# Patient Record
Sex: FEMALE | Race: WHITE | NOT HISPANIC OR LATINO | Employment: FULL TIME | URBAN - METROPOLITAN AREA
[De-identification: names, ages, dates, MRNs, and addresses within clinical notes are randomized per-mention and may not be internally consistent; named-entity substitution may affect disease eponyms.]

---

## 2017-08-15 ENCOUNTER — OFFICE VISIT (OUTPATIENT)
Dept: PEDIATRICS | Facility: CLINIC | Age: 37
End: 2017-08-15
Payer: COMMERCIAL

## 2017-08-15 VITALS
BODY MASS INDEX: 29.8 KG/M2 | WEIGHT: 185.4 LBS | SYSTOLIC BLOOD PRESSURE: 106 MMHG | HEIGHT: 66 IN | HEART RATE: 70 BPM | TEMPERATURE: 98 F | DIASTOLIC BLOOD PRESSURE: 69 MMHG

## 2017-08-15 DIAGNOSIS — Z30.430 ENCOUNTER FOR IUD INSERTION: Primary | ICD-10-CM

## 2017-08-15 DIAGNOSIS — Z30.432 ENCOUNTER FOR IUD REMOVAL: ICD-10-CM

## 2017-08-15 PROCEDURE — 58300 INSERT INTRAUTERINE DEVICE: CPT | Performed by: NURSE PRACTITIONER

## 2017-08-15 PROCEDURE — 58301 REMOVE INTRAUTERINE DEVICE: CPT | Performed by: NURSE PRACTITIONER

## 2017-08-15 NOTE — NURSING NOTE
"Chief Complaint   Patient presents with     Contraception     IUD vs Nexplanon.       Initial /69  Pulse 70  Temp 98  F (36.7  C) (Tympanic)  Ht 5' 6\" (1.676 m)  Wt 185 lb 6.4 oz (84.1 kg)  BMI 29.92 kg/m2 Estimated body mass index is 29.92 kg/(m^2) as calculated from the following:    Height as of this encounter: 5' 6\" (1.676 m).    Weight as of this encounter: 185 lb 6.4 oz (84.1 kg).  Medication Reconciliation: complete    "

## 2017-08-15 NOTE — MR AVS SNAPSHOT
After Visit Summary   8/15/2017    Lesli Grey    MRN: [id number]           Patient Information     Date Of Birth          1980        Visit Information        Provider Department      8/15/2017 10:00 AM Olga Lidia Galindo APRN CNP [facility]        Care Instructions    You may experience a period or spotting that will last over the next week. This usually starts to lighten after a few days. By the time you come back to clinic in 1 month for follow up, this spotting is usually gone, unless you are one of the few that experience spotting that can last up to 3-6 months. You may experience cramping for the remainder of the day and continue to take ibuprofen 2-3 tabs every six hours as needed. This will likely subside over the next day.      Schedule a follow up appointment with me in 1 month. Please contact me by [employer] of phone if you should have any unusual symptoms or concerns in the mean time.             Follow-ups after your visit        Your next 10 appointments already scheduled     Aug 25, 2017  3:30 PM CDT   PHYSICAL with GUERLINE Pennington CNP   [facility] ([facility])    [address] [place] [address]  [address]  [facility] [id number] [phone]              Who to contact     If you have questions or need follow up information about today's clinic visit or your schedule please contact [facility] directly at [phone].  Normal or non-critical lab and imaging results will be communicated to you by MyChart, letter or phone within 4 business days after the clinic has received the results. If you do not hear from us within 7 days, please contact the clinic through MyChart or phone. If you have a critical or abnormal lab result, we will notify you by phone as soon as possible.  Submit refill requests through [employer] or call your pharmacy and they will forward the refill request to us. Please allow 3 business days for  "your refill to be completed.          Additional Information About Your Visit        MyChart Information     [employer] gives you secure access to your electronic health record. If you see a primary care provider, you can also send messages to your care team and make appointments. If you have questions, please call your primary care clinic.  If you do not have a primary care provider, please call [phone] and they will assist you.        Care EveryWhere ID     This is your Care EveryWhere ID. This could be used by other organizations to access your [place] medical records  [id number]        Your Vitals Were     Pulse Temperature Height BMI (Body Mass Index)          70 98  F (36.7  C) (Tympanic) 5' 6\" (1.676 m) 29.92 kg/m2         Blood Pressure from Last 3 Encounters:   08/15/17 106/69   04/21/16 106/69   03/22/16 101/62    Weight from Last 3 Encounters:   08/15/17 185 lb 6.4 oz (84.1 kg)   04/21/16 174 lb (78.9 kg)   03/22/16 175 lb 3.2 oz (79.5 kg)              Today, you had the following     No orders found for display       Primary Care Provider Office Phone # Fax #    Jamila TIERRA Perez [phone] [address] [facility] [address]        Equal Access to Services     [facility]: Hadii aad ku hadasho Soomaali, waaxda luqadaha, qaybta kaalmada adeegyada, waxay idiin hayaan eva pleitez . So [facility] [phone].    ATENCIÓN: Si habla español, tiene a bonner disposición servicios gratuitos de asistencia lingüística. Llame al [phone].    We comply with applicable federal civil rights laws and [place] laws. We do not discriminate on the basis of race, color, national origin, age, disability sex, sexual orientation or gender identity.            Thank you!     Thank you for choosing [facility]  for your care. Our goal is always to provide you with excellent care. Hearing back from our patients is one way we can continue to improve our services. Please take a " few minutes to complete the written survey that you may receive in the mail after your visit with us. Thank you!             Your Updated Medication List - Protect others around you: Learn how to safely use, store and throw away your medicines at www.disposemymeds.org.          This list is accurate as of: 8/15/17 10:51 AM.  Always use your most recent med list.                   Brand Name Dispense Instructions for use Diagnosis    MULTIVITAMIN PO      Take  by mouth.

## 2017-08-15 NOTE — PROGRESS NOTES
"Chief Complaint/ History of Present Illness:Lesli Grey is a 36 year old female.   No LMP recorded. Patient is not currently having periods (Reason: IUD)..  A pregnancy test was not done today.  She is currently using IUD for birth control.   A chlamydia/gonorrhea test was was not done today because of not needed  She is here for an IUD insertion.  Patient has been given written information.  I have reviewed the risks of the IUD including pregnancy, PID, life threatening infection, perforation, expulsion, cramping, changes in bleeding and ovarian cysts. Benefits of the IUD and alternative family planning methods have been discussed.  Patients questions are answered.  Patient has verbalized understanding of risks and benefits and has signed the consent form.    No Known Allergies  Current Outpatient Prescriptions   Medication Sig Dispense Refill     Multiple Vitamin (MULTIVITAMIN OR) Take  by mouth.        Past Medical History:   Diagnosis Date     NO ACTIVE PROBLEMS      Past Surgical History:   Procedure Laterality Date     HC INSERTION INTRAUTERINE DEVICE  9-11-12    Mirena     TONSILLECTOMY  1988       REVIEW OF SYSTEMS  CONSTITUTIONAL: Denies fever, chills and night sweats  BREASTS:  Denies discharge and lumps.    HEART/LUNGS: Denies dyspnea, wheezing, coughing and chest pain.  HEMATOLOGIC: Denies tendency to bruise and history of blood clots.  GENITOURINARY:  Denies urgency, dysuria and hematuria.  NEUROLOGIC:  Denies seizures, weakness and fainting.  PSYCHIATRIC: Negative for changes in mood or affect      EXAM:  /69  Pulse 70  Temp 98  F (36.7  C) (Tympanic)  Ht 5' 6\" (1.676 m)  Wt 185 lb 6.4 oz (84.1 kg)  BMI 29.92 kg/m2    Abdomen: soft, nontender, without hepatosplenomegaly or masses  : normal cervix, adnexae, and uterus without masses or discharge. IUD strings visualized and removed with ring forceps  IUD type: Mirena  Lot # [id number]    Procedure:  Uterus assessed for position and is " Anteverted.  Speculum inserted.  Betadine prep of cervix done.  Tenaculumnot used.  Uterus sounded to 6cm's.  Cervical dilators used.   IUD inserted in the usual fashion without problems, ie: resistance, severe protracted pain or excessive bleeding.    Strings trimmed to 2 cm's.  Patient tolerated the procedure well without any prolonged pain or syncopy.    ASSESSMENT / PLAN:  (Z30.430) Encounter for IUD insertion  (primary encounter diagnosis)  Comment:   Plan: levonorgestrel (MIRENA) 20 MCG/24HR IUD, HC         LEVONORGESTREL IU 52MG 5 YR, INSERTION         INTRAUTERINE DEVICE            (Z30.432) Encounter for IUD removal  Comment:   Plan: REMOVE INTRAUTERINE DEVICE            Patient Instructions   You may experience a period or spotting that will last over the next week. This usually starts to lighten after a few days. By the time you come back to clinic in 1 month for follow up, this spotting is usually gone, unless you are one of the few that experience spotting that can last up to 3-6 months. You may experience cramping for the remainder of the day and continue to take ibuprofen 2-3 tabs every six hours as needed. This will likely subside over the next day.      Schedule a follow up appointment with me in 1 month. Please contact me by mychart of phone if you should have any unusual symptoms or concerns in the mean time.           Instructions given to patient regarding checking IUD strings, returning to the clinic if pain or inability to check strings and/or irregular bleeding.    Pt to RTC in 4-6 weeks for IUD check.

## 2017-12-18 ENCOUNTER — OFFICE VISIT (OUTPATIENT)
Dept: FAMILY MEDICINE | Facility: CLINIC | Age: 37
End: 2017-12-18
Payer: COMMERCIAL

## 2017-12-18 VITALS
WEIGHT: 184 LBS | HEIGHT: 66 IN | HEART RATE: 64 BPM | BODY MASS INDEX: 29.57 KG/M2 | RESPIRATION RATE: 16 BRPM | DIASTOLIC BLOOD PRESSURE: 76 MMHG | TEMPERATURE: 97.9 F | SYSTOLIC BLOOD PRESSURE: 102 MMHG

## 2017-12-18 DIAGNOSIS — E16.2 HYPOGLYCEMIA: Primary | ICD-10-CM

## 2017-12-18 LAB — GLUCOSE BLD-MCNC: 113 MG/DL (ref 70–99)

## 2017-12-18 PROCEDURE — 82947 ASSAY GLUCOSE BLOOD QUANT: CPT | Performed by: FAMILY MEDICINE

## 2017-12-18 PROCEDURE — 36416 COLLJ CAPILLARY BLOOD SPEC: CPT | Performed by: FAMILY MEDICINE

## 2017-12-18 PROCEDURE — 99213 OFFICE O/P EST LOW 20 MIN: CPT | Performed by: FAMILY MEDICINE

## 2017-12-18 NOTE — NURSING NOTE
"Chief Complaint   Patient presents with     Dizziness     lightheaded. low blood sugar.        Initial /76  Pulse 64  Temp 97.9  F (36.6  C) (Oral)  Resp 16  Ht 5' 6\" (1.676 m)  Wt 184 lb (83.5 kg)  BMI 29.7 kg/m2 Estimated body mass index is 29.7 kg/(m^2) as calculated from the following:    Height as of this encounter: 5' 6\" (1.676 m).    Weight as of this encounter: 184 lb (83.5 kg).  Medication Reconciliation: complete  "

## 2017-12-18 NOTE — MR AVS SNAPSHOT
After Visit Summary   12/18/2017    Lesli Grey    MRN: [id number]           Patient Information     Date Of Birth          1980        Visit Information        Provider Department      12/18/2017 1:40 PM Prudence Poe MD [facility]        Today's Diagnoses     Hypoglycemia    -  1       Follow-ups after your visit        Follow-up notes from your care team     Return if symptoms worsen or fail to improve.      Your next 10 appointments already scheduled     Dec 18, 2017  1:40 PM CST   [facility] Visit with [facility]udence [facility]oe [facility]   [facility]    [address] [facility] [id number] [phone]           Bring a current list of meds and any records pertaining to this visit. For Physicals, please bring immunization records and any forms needing to be filled out. Please arrive 10 minutes early to complete paperwork.              Who to contact     If you have questions or need follow up information about today's clinic visit or your schedule please contact [facility] directly at [phone].  Normal or non-critical lab and imaging results will be communicated to you by MyChart, letter or phone within 4 business days after the clinic has received the results. If you do not hear from us within 7 days, please contact the clinic through [employer] or phone. If you have a critical or abnormal lab result, we will notify you by phone as soon as possible.  Submit refill requests through [employer] or call your pharmacy and they will forward the refill request to us. Please allow 3 business days for your refill to be completed.          Additional Information About Your Visit        MyChart Information     [employer] gives you secure access to your electronic health record. If you see a primary care provider, you can also send messages to your care team and make appointments. If  "you have questions, please call your primary care clinic.  If you do not have a primary care provider, please call [phone] and they will assist you.        Care EveryWhere ID     This is your Care EveryWhere ID. This could be used by other organizations to access your [place] medical records  [id number]        Your Vitals Were     Pulse Temperature Respirations Height BMI (Body Mass Index)       64 97.9  F (36.6  C) (Oral) 16 5' 6\" (1.676 m) 29.7 kg/m2        Blood Pressure from Last 3 Encounters:   12/18/17 102/76   08/15/17 106/69   04/21/16 106/69    Weight from Last 3 Encounters:   12/18/17 184 lb (83.5 kg)   08/15/17 185 lb 6.4 oz (84.1 kg)   04/21/16 174 lb (78.9 kg)              We Performed the Following     Glucose, whole blood        Primary Care Provider Office Phone # Fax #    Jamila Perez [phone]       [address] [facility] [address]        Equal Access to Services     [facility]: Hadii aad ku hadasho Soomaali, waaxda luqadaha, qaybta kaalmada adeelizabethyada, sarah pleitez . So [facility] [phone].    ATENCIÓN: Si habla español, tiene a bonner disposición servicios gratuitos de asistencia lingüística. [facility] [phone].    We comply with applicable federal civil rights laws and [place] laws. We do not discriminate on the basis of race, color, national origin, age, disability, sex, sexual orientation, or gender identity.            Thank you!     Thank you for choosing [facility]  for your care. Our goal is always to provide you with excellent care. Hearing back from our patients is one way we can continue to improve our services. Please take a few minutes to complete the written survey that you may receive in the mail after your visit with us. Thank you!             Your Updated Medication List - Protect others around you: Learn how to safely use, store and throw away your medicines at www.disposemymeds.org.        "   This list is accurate as of: 12/18/17 12:18 PM.  Always use your most recent med list.                   Brand Name Dispense Instructions for use Diagnosis    levonorgestrel 20 MCG/24HR IUD    MIRENA    1 each    1 each (20 mcg) by Intrauterine route once for 1 dose    Encounter for IUD insertion       MULTIVITAMIN PO      Take  by mouth.

## 2017-12-18 NOTE — PROGRESS NOTES
SUBJECTIVE:   Lesli Grey is a 37 year old female who presents to clinic today for the following health issues:      LIghtheaded, feeling of passing out      Duration: shaky around 9 am     Description   Feeling faint:  no   Feeling like the surroundings are moving: YES  Loss of consciousness or falls: no     Intensity:  moderate    Accompanying signs and symptoms:   Nausea/vomitting: YES  Palpitations: no   Weakness in arms or legs: YES  Vision or speech changes: YES- loses peripheral vision  Ringing in ears (Tinnitus): no   Hearing loss related to dizziness: no   Other (fevers/chills/sweating/dyspnea): no     History (similar episodes/head trauma/previous evaluation/recent bleeding): yes for years. Gets better after she eats but didn't really help today.     Precipitating or alleviating factors (new meds/chemicals): mostly not eating and caffiene  Worse with activity/head movement: no     Therapies tried and outcome: eating better.     Patient presents today with a coworker with concerns of hypoglycemia.  She has struggled with this in the past and today began to feel shaky and faint at 9 am.    Patient typically eats an egg sandwich on a bagel with a latte for breakfast.  This is followed by a mid-morning bagel with honey and butter.  She eats a good lunch and typically does not have any symptoms in the afternoons or evening.      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        Problem list and histories reviewed & adjusted, as indicated.  Additional history: as documented    Patient Active Problem List   Diagnosis     CARDIOVASCULAR SCREENING; LDL GOAL LESS THAN 160     Encounter for IUD insertion     Past Surgical History:   Procedure Laterality Date     HC INSERTION INTRAUTERINE DEVICE  9-11-12    Mirena     HC LEVONORGESTREL IU 52MG 5 YR  08/15/2017     TONSILLECTOMY  1988       Social History   Substance Use Topics     Smoking status: Never Smoker     Smokeless tobacco:  "Never Used     Alcohol use Yes     Family History   Problem Relation Age of Onset     CANCER Maternal Grandmother          Current Outpatient Prescriptions   Medication Sig Dispense Refill     levonorgestrel (MIRENA) 20 MCG/24HR IUD 1 each (20 mcg) by Intrauterine route once for 1 dose 1 each 0     Multiple Vitamin (MULTIVITAMIN OR) Take  by mouth.       No Known Allergies  BP Readings from Last 3 Encounters:   12/18/17 102/76   08/15/17 106/69   04/21/16 106/69    Wt Readings from Last 3 Encounters:   12/18/17 184 lb (83.5 kg)   08/15/17 185 lb 6.4 oz (84.1 kg)   04/21/16 174 lb (78.9 kg)                        Reviewed and updated as needed this visit by clinical staffTobacco  Allergies       Reviewed and updated as needed this visit by Provider         ROS:  Constitutional, HEENT, cardiovascular, pulmonary, gi and gu systems are negative, except as otherwise noted.      OBJECTIVE:   /76  Pulse 64  Temp 97.9  F (36.6  C) (Oral)  Resp 16  Ht 5' 6\" (1.676 m)  Wt 184 lb (83.5 kg)  BMI 29.7 kg/m2  Body mass index is 29.7 kg/(m^2).  GENERAL: healthy, alert and no distress  EYES: Eyes grossly normal to inspection, PERRL and conjunctivae and sclerae normal  NECK: no adenopathy, no asymmetry, masses, or scars and thyroid normal to palpation  MS: no gross musculoskeletal defects noted, no edema  SKIN: no suspicious lesions or rashes  PSYCH: mentation appears normal, affect normal/bright    Diagnostic Test Results:  Results for orders placed or performed in visit on 12/18/17 (from the past 24 hour(s))   Glucose, whole blood   Result Value Ref Range    Glucose Whole Blood 113 (H) 70 - 99 mg/dL       ASSESSMENT/PLAN:     1. Hypoglycemia    - Glucose, whole blood    DIscussed importance of eating 6 small meals throughout the day to avoid sugar spikes and lows.  Recommend adding a protein/high good fat snack mid AM instead of a simple carb to help be more euglycemic throughout the day.  AKSHAT prn.    Prudence ROWLEY" MD Lui  [facility]

## 2018-03-16 ENCOUNTER — OFFICE VISIT (OUTPATIENT)
Dept: FAMILY MEDICINE | Facility: CLINIC | Age: 38
End: 2018-03-16
Payer: COMMERCIAL

## 2018-03-16 VITALS
HEIGHT: 66 IN | TEMPERATURE: 97.1 F | HEART RATE: 70 BPM | DIASTOLIC BLOOD PRESSURE: 67 MMHG | WEIGHT: 187.5 LBS | SYSTOLIC BLOOD PRESSURE: 104 MMHG | OXYGEN SATURATION: 100 % | BODY MASS INDEX: 30.13 KG/M2

## 2018-03-16 DIAGNOSIS — E16.2 HYPOGLYCEMIA: Primary | ICD-10-CM

## 2018-03-16 PROCEDURE — 99213 OFFICE O/P EST LOW 20 MIN: CPT | Performed by: FAMILY MEDICINE

## 2018-03-16 NOTE — PROGRESS NOTES
SUBJECTIVE:   Lesli Grey is a 37 year old female who presents to clinic today for the following health issues:    Patient presents to discuss further concerns regarding episodic hypoglycemic events.  Has struggled with hypoglycemia since HS but feels episodes are getting more frequent and harder to recuperate from.    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  Is able to take breaks to eat appropriately inbetween meals.  A typical diet looks like:     AM Egg/Sausage/Bagel and coffee  10AM snack with protein  Noon- lunch  midPM- snack with protein  PM- meat and veggies    Last meal of day is at 5PM-- she typically does not eat again until breakfast.  Goes to bed around 8PM.    States worst time for episodes seems to be end of AM or early afternoon.    Problem list and histories reviewed & adjusted, as indicated.  Additional history: as documented    Patient Active Problem List   Diagnosis     CARDIOVASCULAR SCREENING; LDL GOAL LESS THAN 160     Encounter for IUD insertion     Past Surgical History:   Procedure Laterality Date     HC INSERTION INTRAUTERINE DEVICE  9-11-12    Mirena     HC LEVONORGESTREL IU 52MG 5 YR  08/15/2017     TONSILLECTOMY  1988       Social History   Substance Use Topics     Smoking status: Never Smoker     Smokeless tobacco: Never Used     Alcohol use Yes     Family History   Problem Relation Age of Onset     CANCER Maternal Grandmother          Current Outpatient Prescriptions   Medication Sig Dispense Refill     Multiple Vitamin (MULTIVITAMIN OR) Take  by mouth.       levonorgestrel (MIRENA) 20 MCG/24HR IUD 1 each (20 mcg) by Intrauterine route once for 1 dose 1 each 0     No Known Allergies  BP Readings from Last 3 Encounters:   03/16/18 104/67   12/18/17 102/76   08/15/17 106/69    Wt Readings from Last 3 Encounters:   03/16/18 187 lb 8 oz (85 kg)   12/18/17 184 lb (83.5 kg)   08/15/17 185 lb 6.4 oz (84.1 kg)                    Reviewed and updated as needed this visit by  "clinical staff  Tobacco  Allergies  Med Hx  Surg Hx  Fam Hx  Soc Hx      Reviewed and updated as needed this visit by Provider         ROS:  Constitutional, HEENT, cardiovascular, pulmonary, gi and gu systems are negative, except as otherwise noted.    OBJECTIVE:     /67  Pulse 70  Temp 97.1  F (36.2  C) (Oral)  Ht 5' 6\" (1.676 m)  Wt 187 lb 8 oz (85 kg)  LMP  (LMP Unknown)  SpO2 100%  Breastfeeding? No  BMI 30.26 kg/m2  Body mass index is 30.26 kg/(m^2).  GENERAL: healthy, alert and no distress  EYES: Eyes grossly normal to inspection, PERRL and conjunctivae and sclerae normal  NECK: no adenopathy, no asymmetry, masses, or scars and thyroid normal to palpation  MS: no gross musculoskeletal defects noted, no edema  SKIN: no suspicious lesions or rashes  NEURO: Normal strength and tone, mentation intact and speech normal  PSYCH: mentation appears normal, affect normal/bright    Diagnostic Test Results:  none     ASSESSMENT/PLAN:     1. Hypoglycemia    - ENDOCRINOLOGY ADULT REFERRAL    We discussed adding more good carbs to breakfast and AM snack to help avoid lows in late AM or early PM.  We discussed adding a balanced bedtime snack to perhaps offset low sugars in AM.    Because this has been a chronic issue with acute worsening despite more education and lifestyle modification-- we both agree a referral to ENDO seems to be the most prudent thing to continue the eval and work up for other possible metabolic or less common endocrine tumor related reasons for volatile sugars.  Patient voices agreement of plan.    Prudence Poe MD  [facility]  "

## 2018-04-10 ENCOUNTER — OFFICE VISIT (OUTPATIENT)
Dept: ENDOCRINOLOGY | Facility: CLINIC | Age: 38
End: 2018-04-10
Payer: COMMERCIAL

## 2018-04-10 VITALS
WEIGHT: 191 LBS | DIASTOLIC BLOOD PRESSURE: 68 MMHG | RESPIRATION RATE: 16 BRPM | SYSTOLIC BLOOD PRESSURE: 117 MMHG | HEART RATE: 77 BPM | BODY MASS INDEX: 30.83 KG/M2 | TEMPERATURE: 98.1 F

## 2018-04-10 DIAGNOSIS — R40.4 TRANSIENT ALTERATION OF AWARENESS: ICD-10-CM

## 2018-04-10 DIAGNOSIS — M62.81 GENERALIZED MUSCLE WEAKNESS: ICD-10-CM

## 2018-04-10 DIAGNOSIS — E16.2 HYPOGLYCEMIA: Primary | ICD-10-CM

## 2018-04-10 PROCEDURE — 99204 OFFICE O/P NEW MOD 45 MIN: CPT | Performed by: CLINICAL NURSE SPECIALIST

## 2018-04-10 NOTE — MR AVS SNAPSHOT
After Visit Summary   4/10/2018    Lesli Grey    MRN: [id number]           Patient Information     Date Of Birth          1980        Visit Information        Provider Department      4/10/2018 2:00 PM Ama Grove APRN CNP [facility]        Today's Diagnoses     Hypoglycemia    -  1      Care Instructions      Make an 8 am fasting lab appointment - fast for 8-12 hours prior to the lab test.    Keep a symptom diary - check your blood sugar when you are symptomatic - record symptoms, how long ago you ate prior to the symptoms, severity of the symptoms, and the blood sugar reading    You can also do some testing before and 1-2 hours after eating to see if your blood sugar is spiking after eating.    Normal fasting glucose 70-90  Low blood sugar below 70  High blood sugar > 140            Follow-ups after your visit        Your next 10 appointments already scheduled     Apr 17, 2018  8:00 AM CDT   LAB with EA LAB   [facility] [place] ([facility])    [address]  [address]  [place] [address] [phone]           Please do not eat 10-12 hours before your appointment if you are coming in fasting for labs on lipids, cholesterol, or glucose (sugar). This does not apply to pregnant women. Water, hot tea and black coffee (with nothing added) are okay. Do not drink other fluids, diet soda or chew gum.            May 01, 2018  4:00 PM CDT   New Visit with Lily Mann MD   [facility] [place] ([facility])    [address]  [address]  [place] [address] [phone]              Who to contact     If you have questions or need follow up information about today's clinic visit or your schedule please contact [facility] directly at [phone].  Normal or non-critical lab and imaging results will be communicated to you by MyChart, letter or phone within 4 business days after  the clinic has received the results. If you do not hear from us within 7 days, please contact the clinic through [employer] or phone. If you have a critical or abnormal lab result, we will notify you by phone as soon as possible.  Submit refill requests through [employer] or call your pharmacy and they will forward the refill request to us. Please allow 3 business days for your refill to be completed.          Additional Information About Your Visit        [employer] Information     [employer] gives you secure access to your electronic health record. If you see a primary care provider, you can also send messages to your care team and make appointments. If you have questions, please call your primary care clinic.  If you do not have a primary care provider, please call [phone] and they will assist you.        Care EveryWhere ID     This is your Care EveryWhere ID. This could be used by other organizations to access your [place] medical records  [id number]        Your Vitals Were     Pulse Temperature Respirations Last Period BMI (Body Mass Index)       77 98.1  F (36.7  C) (Oral) 16 (LMP Unknown) 30.83 kg/m2        Blood Pressure from Last 3 Encounters:   04/10/18 117/68   03/16/18 104/67   12/18/17 102/76    Weight from Last 3 Encounters:   04/10/18 86.6 kg (191 lb)   03/16/18 85 kg (187 lb 8 oz)   12/18/17 83.5 kg (184 lb)              Today, you had the following     No orders found for display         Today's Medication Changes          These changes are accurate as of 4/10/18  3:21 PM.  If you have any questions, ask your nurse or doctor.               Start taking these medicines.        Dose/Directions    blood glucose monitoring lancets   Used for:  Hypoglycemia   Started by:  Ama Grove APRN CNP        Use to test blood sugar 1-2 times daily or as directed.   Quantity:  100 each   Refills:  3       blood glucose monitoring test strip   Commonly known as:  TERI CONTOUR NEXT   Used for:  Hypoglycemia    Started by:  Ama Grove APRN CNP        Use to test blood sugar 1-2 times daily or as directed.   Quantity:  50 strip   Refills:  3            Where to get your medicines      These medications were sent to [facility] - [address]   [address], [place] [address]     Phone:  [phone]     blood glucose monitoring lancets         Some of these will need a paper prescription and others can be bought over the counter.  Ask your nurse if you have questions.     Bring a paper prescription for each of these medications     blood glucose monitoring test strip                Primary Care Provider Office Phone # Fax #    Jamila TIERRA Perez [phone] [address] [facility] [address]  [facility] [address]        Equal Access to Services     YAKELIN ROJO : Hadii maisha piña hadasho Soomaali, waaxda luqadaha, qaybta kaalmada adeegyada, waxay giniin haykatina pleitez . So [facility] [phone].    ATENCIÓN: Si habla español, tiene a bonner disposición servicios gratuitos de asistencia lingüística. Llame al [phone].    We comply with applicable federal civil rights laws and [place] laws. We do not discriminate on the basis of race, color, national origin, age, disability, sex, sexual orientation, or gender identity.            Thank you!     Thank you for choosing [facility]  for your care. Our goal is always to provide you with excellent care. Hearing back from our patients is one way we can continue to improve our services. Please take a few minutes to complete the written survey that you may receive in the mail after your visit with us. Thank you!             Your Updated Medication List - Protect others around you: Learn how to safely use, store and throw away your medicines at www.disposemymeds.org.          This list is accurate as of 4/10/18  3:21 PM.  Always use your most recent med list.                   Brand  Name Dispense Instructions for use Diagnosis    blood glucose monitoring lancets     100 each    Use to test blood sugar 1-2 times daily or as directed.    Hypoglycemia       blood glucose monitoring test strip    TERI CONTOUR NEXT    50 strip    Use to test blood sugar 1-2 times daily or as directed.    Hypoglycemia       levonorgestrel 20 MCG/24HR IUD    MIRENA    1 each    1 each (20 mcg) by Intrauterine route once for 1 dose    Encounter for IUD insertion       MULTIVITAMIN PO      Take  by mouth.

## 2018-04-10 NOTE — PROGRESS NOTES
"Name: Lesli Grey  Seen at the request of No ref. provider found for Hypoglycemia.  HPI:  Lesli Grey is a 37 year old female who presents for the evaluation of:    1. Hypoglycemia.  Milder symptoms started \"as far back\" as she remembers but have been worsening the past 3 years and significantly pronounced the past 6 months.  Feels symptoms are now substantially interfering in her day to day life.  Symptoms occur when she waits too long to eat or if she doesn't eat first thing in the morning after waking up.  Symptoms occur most commonly in the late morning.    Four years ago, symptom occurred twice per year.  Six months ago, symptoms occurred at least once per month.  Recently, symptoms occur daily with the exception of the past 9 days while on vacation and was particularly focused on eating constantly to avoid symptoms.  Symptoms include shakiness, weakness, increased anxiety, confusion, slurred speech, vision changes (loss of peripheral vision and inability to understand written words).  These symptoms are relieved by eating.  Previously were relieved immediately by eating, now it seems to take 20-30 minutes before symptoms subside.  She has found avoiding caffeine and high-sugar foods has helped decrease symptoms/episodes.  No known family history of diabetes.  States she was \"borderline\" GD with her last pregnancy 6 years ago.   GI surgeries: No   Gastric Bypass: Np  Before/After meals: after meals  Fasting: No fasting hypoglycemia  Symptoms: yes, multiple, see above  Family history of DM:   No   Checked BS during \"episodes\": Not yet  History of Cancers:   No  Thyroid Dysfunction:  No  PMH/PSH:  Past Medical History:   Diagnosis Date     NO ACTIVE PROBLEMS      Past Surgical History:   Procedure Laterality Date     HC INSERTION INTRAUTERINE DEVICE  9-11-12    Mirena     HC LEVONORGESTREL IU 52MG 5 YR  08/15/2017     TONSILLECTOMY  1988     Family Hx:  Family History   Problem Relation Age of Onset "     CANCER Maternal Grandmother      Thyroid disease:  No        DM2: No         Autoimmune: DM1, SLE, RA, Vitiligo     Social Hx:  Social History     Social History     Marital status:      Spouse name: Foster     Number of children: 2     Years of education: N/A     Occupational History     asst teacher [employer]     Social History Main Topics     Smoking status: Never Smoker     Smokeless tobacco: Never Used     Alcohol use Yes     Drug use: No     Sexual activity: Yes     Partners: Male     Birth control/ protection: IUD      Comment: Mirena placed 8-15-17,remove 8-15-23     Other Topics Concern     Not on file     Social History Narrative    .    Daughter Jocelynn, born 3/13/08    .        Caffeine intake/servings daily - less than 1    Calcium intake/servings daily - at least 3    Exercise 0 times weekly - describe none    Sunscreen used - Sometimes    Seatbelts used - Yes    Guns stored in the home - No    Self Breast Exam - no    Pap test up to date -  Yes    Eye exam up to date -  Yes    Dental exam up to date -  Yes    DEXA scan up to date -  Not Applicable    Flex Sig/Colonoscopy up to date -  Not Applicable    Mammography up to date -  Not Applicable    Immunizations reviewed and up to date - No and last one was 10 yrs ago    Abuse: Current or Past (Physical, Sexual or Emotional) - No    Do you feel safe in your environment - Yes    Do you cope well with stress - Yes    Do you suffer from insomnia - No    Last updated by: Moon Whitaker  12/26/2011                          MEDICATIONS:  has a current medication list which includes the following prescription(s): blood glucose monitoring, blood glucose monitoring, levonorgestrel, and multiple vitamins-minerals.    ROS     ROS: 10 point ROS neg other than the symptoms noted above in the HPI.    Physical Exam   VS: /68 (BP Location: Left arm, Patient Position: Chair, Cuff Size: Adult Regular)  Pulse 77   Temp 98.1  F (36.7  C) (Oral)  Resp 16  Wt 86.6 kg (191 lb)  LMP  (LMP Unknown)  BMI 30.83 kg/m2  GENERAL: AXOX3, NAD, well dressed, answering questions appropriately, appears stated age.  HEENT: no exophthalmos, no proptosis, EOMI, no lig lag, no retraction  CV: RRR, no rubs, gallops, no murmurs  LUNGS: CTAB, no wheezes, rales, or rhonchi  ABDOMEN: soft, nontender, nondistended  EXTREMITIES: no edema, +pulses, no rashes, no lesions  NEUROLOGY: CN grossly intact, no tremors  MSK: grossly intact  SKIN: no rashes, no lesions    LABS:  BMP:  LFTs:  TFTs:  A1c:  C- Peptide:    All pertinent notes, labs, and images personally reviewed by me.     A/P  Ms.Catherine TIERRA Grey is a 37 year old here for the evaluation of:    1. Hypoglycemia    No fasting symptoms.  No documented hypoglycemia seen on previous labs.  Symptoms most consistent with reactive hypoglycemia.  Previous hypoglycemic work up done in 2015 was unremarkable.  Will obtain hypoglycemic work up.  Glucose meter provided.  Recommend she keep a symptom diary and check her blood sugar/record the reading when symptomatic.  Also requested she record symptoms, severity, previous meal (timing and content), along with treatment (type and response).  If labs are unremarkable, consider rechecking insulin, glucose, and c-peptide while symptomatic.    Labs ordered today:   Orders Placed This Encounter   Procedures     Beta hydroxybutyrate level     Cortisol     C-peptide     Glucose     Insulin antibody     Insulin Growth Factor 1 by Immunoassay     Insulin level     Proinsulin     TSH     T4 FREE     Hemoglobin A1c     Radiology/Consults ordered today: None    More than 50% of the time spent with Ms. Grey on counseling / coordinating her care.  Total face to face time was 45 minutes.      Follow-up:  To be determined based on lab results    Ama Grove NP  Endocrinology  [facility]  CC: Jamila Perez

## 2018-04-10 NOTE — PATIENT INSTRUCTIONS
Make an 8 am fasting lab appointment - fast for 8-12 hours prior to the lab test.    Keep a symptom diary - check your blood sugar when you are symptomatic - record symptoms, how long ago you ate prior to the symptoms, severity of the symptoms, and the blood sugar reading    You can also do some testing before and 1-2 hours after eating to see if your blood sugar is spiking after eating.    Normal fasting glucose 70-90  Low blood sugar below 70  High blood sugar > 140

## 2018-04-10 NOTE — NURSING NOTE
"Chief Complaint   Patient presents with     Consult     hypoglycemia       Initial /68 (BP Location: Left arm, Patient Position: Chair, Cuff Size: Adult Regular)  Pulse 77  Temp 98.1  F (36.7  C) (Oral)  Resp 16  Wt 86.6 kg (191 lb)  LMP  (LMP Unknown)  BMI 30.83 kg/m2 Estimated body mass index is 30.83 kg/(m^2) as calculated from the following:    Height as of 3/16/18: 1.676 m (5' 6\").    Weight as of this encounter: 86.6 kg (191 lb).  Medication Reconciliation: complete    "

## 2018-04-17 DIAGNOSIS — M62.81 GENERALIZED MUSCLE WEAKNESS: ICD-10-CM

## 2018-04-17 DIAGNOSIS — E16.2 HYPOGLYCEMIA: ICD-10-CM

## 2018-04-17 DIAGNOSIS — R40.4 TRANSIENT ALTERATION OF AWARENESS: ICD-10-CM

## 2018-04-17 LAB
CORTIS SERPL-MCNC: 9.9 UG/DL (ref 4–22)
HBA1C MFR BLD: 5.6 % (ref 0–5.6)
INSULIN SERPL-ACNC: 7.3 MU/L (ref 3–25)

## 2018-04-17 PROCEDURE — 84681 ASSAY OF C-PEPTIDE: CPT | Performed by: CLINICAL NURSE SPECIALIST

## 2018-04-17 PROCEDURE — 86337 INSULIN ANTIBODIES: CPT | Mod: 90 | Performed by: CLINICAL NURSE SPECIALIST

## 2018-04-17 PROCEDURE — 84443 ASSAY THYROID STIM HORMONE: CPT | Performed by: CLINICAL NURSE SPECIALIST

## 2018-04-17 PROCEDURE — 84439 ASSAY OF FREE THYROXINE: CPT | Performed by: CLINICAL NURSE SPECIALIST

## 2018-04-17 PROCEDURE — 36415 COLL VENOUS BLD VENIPUNCTURE: CPT | Performed by: CLINICAL NURSE SPECIALIST

## 2018-04-17 PROCEDURE — 84305 ASSAY OF SOMATOMEDIN: CPT | Performed by: CLINICAL NURSE SPECIALIST

## 2018-04-17 PROCEDURE — 84206 ASSAY OF PROINSULIN: CPT | Mod: 90 | Performed by: CLINICAL NURSE SPECIALIST

## 2018-04-17 PROCEDURE — 82947 ASSAY GLUCOSE BLOOD QUANT: CPT | Performed by: CLINICAL NURSE SPECIALIST

## 2018-04-17 PROCEDURE — 83036 HEMOGLOBIN GLYCOSYLATED A1C: CPT | Performed by: CLINICAL NURSE SPECIALIST

## 2018-04-17 PROCEDURE — 83525 ASSAY OF INSULIN: CPT | Performed by: CLINICAL NURSE SPECIALIST

## 2018-04-17 PROCEDURE — 82533 TOTAL CORTISOL: CPT | Performed by: CLINICAL NURSE SPECIALIST

## 2018-04-17 PROCEDURE — 99000 SPECIMEN HANDLING OFFICE-LAB: CPT | Performed by: CLINICAL NURSE SPECIALIST

## 2018-04-17 PROCEDURE — 82010 KETONE BODYS QUAN: CPT | Mod: 90 | Performed by: CLINICAL NURSE SPECIALIST

## 2018-04-18 LAB
C PEPTIDE SERPL-MCNC: 1.7 NG/ML (ref 0.9–6.9)
GLUCOSE SERPL-MCNC: 92 MG/DL (ref 70–99)
IGF-I BLD-MCNC: 285 NG/ML (ref 78–274)
T4 FREE SERPL-MCNC: 0.78 NG/DL (ref 0.76–1.46)
TSH SERPL DL<=0.005 MIU/L-ACNC: 1.68 MU/L (ref 0.4–4)

## 2018-04-19 LAB
B-OH-BUTYR SERPL-MCNC: 0.8 MG/DL (ref 0–3)
PROINSULIN P 12H FAST SERPL-SCNC: 3 PMOL/L

## 2018-04-20 LAB — INSULIN HUMAN AB SER-ACNC: <0.4 U/ML (ref 0–0.4)

## 2018-04-21 ENCOUNTER — MYC MEDICAL ADVICE (OUTPATIENT)
Dept: ENDOCRINOLOGY | Facility: CLINIC | Age: 38
End: 2018-04-21

## 2018-04-21 NOTE — PROGRESS NOTES
Please call  Lesli,  Your lab results are normal.  I don't see any cause of your hypoglycemia.  We could try a continuous glucose test that tracks you blood sugars for 24 hours per day for two weeks.  Let me know if you are interested in scheduling this test.  Ama Grove NP  Endocrinology

## 2018-04-23 NOTE — TELEPHONE ENCOUNTER
?? Does not show she has viewed result note but now I see below message.  Will monitor if she views below message.  Eryn Trinidad RN    Notes Recorded by Eryn Trinidad RN on 4/23/2018 at 2:39 PM  L/M to call or check [employer] for message.  See telephone encounter.  Eryn Trinidad RN    ------    Notes Recorded by Ama Grove APRN CNP on 4/21/2018 at 5:28 PM  Please call  Lesli,  Your lab results are normal.  I don't see any cause of your hypoglycemia.  We could try a continuous glucose test that tracks you blood sugars for 24 hours per day for two weeks.  Let me know if you are interested in scheduling this test.  Ama Grove NP  Endocrinology    April 23, 2018   Ama Grove APRN CNP   to Lesli M Que           7:20 AM   Matias Ballesteros,   The only test I see that is a little borderline was the slightly elevated IGF-1.  This is a way we screen for growth hormone abnormalities.  We could repeat the test in another 3 months just to recheck.  All your other tests looked normal to me.  The 24-hour test involves placing a glucose senor on your arm (you can bath/shower normally while wearing it).  You return to the clinic in two weeks and we remove the sensor and download the information.  If all the blood sugars you have tested using the meter I gave you have been normal, changes are high that the 24-hour glucose monitor will not show any abnormalities either - it is just another test we could do to make sure there aren't any documented low glucose levels.     I think it's fine to wait on the test, especially if you are finding ways to manage your symptoms with diet changes.   Let me know if you have any other questions or concerns.   Ama Grove NP   Endocrinology      This [employer] message has not been read.             7:03 AM   Neeru Vera RN routed this conversation to Ama Grove APRN CNP    April 21, 2018   Lesli Grey   to GUERLINE Mendenhall CNP           5:50  PM   Ben Grove,     Thanks for getting back to me about the test results. I'm glad to hear your interpretation. Some of the results had seemed a little borderline to me and I didn't quite know what that meant. Glad to hear that they are normal. I'm curious to know what the 24-hour test would look like? Is that something I can do at home with the glucose meter? I have mixed thoughts on moving forward as since I've cut out caffeine and altered my diet I've noticed a lot of improvement.. However now that the ball is rolling I'm tempted to keep going and get some answers. I guess it would depend on how  challenging the test is.       I'd love to hear your recommendation. Hope you're enjoying the good weather.      thank you.     Lesli

## 2018-07-23 NOTE — MR AVS SNAPSHOT
After Visit Summary   3/16/2018    Lesli Grey    MRN: [id number]           Patient Information     Date Of Birth          1980        Visit Information        Provider Department      3/16/2018 2:40 PM Prudence Poe MD [facility]        Today's Diagnoses     Hypoglycemia    -  1       Follow-ups after your visit        Additional Services     ENDOCRINOLOGY ADULT REFERRAL       Your provider [place] you [place] [facility] [facility] [place] [phone]   [email or web address]      Please be aware that coverage of these services is subject to the terms and limitations of your health insurance plan.  Call member services at your health plan with any benefit or coverage questions.      Please bring the following to your appointment:    >>   Any x-rays, CTs or MRIs which have been performed.  Contact the facility where they were done to arrange for  prior to your scheduled appointment.    >>   List of current medications   >>   This referral request   >>   Any documents/labs given to you for this referral                  Who to contact     If you have questions or need follow up information about today's clinic visit or your schedule please contact [facility] directly at [phone].  Normal or non-critical lab and imaging results will be communicated to you by MyChart, letter or phone within 4 business days after the clinic has received the results. If you do not hear from us within 7 days, please contact the clinic through MyChart or phone. If you have a critical or abnormal lab result, we will notify you by phone as soon as possible.  Submit refill requests through [employer] or call your pharmacy and they will forward the refill request to us. Please allow 3 business days for your refill to be completed.          Additional Information About Your Visit        MyChart Information     [employer] gives  Valsartan Recall - Replacing it with Losartan 100mg with Hztc 25mg  Pt notified in detail of recall and will continue medication until he receives new med  He will monitor BP  "you secure access to your electronic health record. If you see a primary care provider, you can also send messages to your care team and make appointments. If you have questions, please call your primary care clinic.  If you do not have a primary care provider, please call [phone] and they will assist you.        Care EveryWhere ID     This is your Care EveryWhere ID. This could be used by other organizations to access your [place] medical records  [id number]        Your Vitals Were     Pulse Temperature Height Last Period Pulse Oximetry Breastfeeding?    70 97.1  F (36.2  C) (Oral) 5' 6\" (1.676 m) (LMP Unknown) 100% No    BMI (Body Mass Index)                   30.26 kg/m2            Blood Pressure from Last 3 Encounters:   03/16/18 104/67   12/18/17 102/76   08/15/17 106/69    Weight from Last 3 Encounters:   03/16/18 187 lb 8 oz (85 kg)   12/18/17 184 lb (83.5 kg)   08/15/17 185 lb 6.4 oz (84.1 kg)              We Performed the Following     ENDOCRINOLOGY ADULT REFERRAL        Primary Care Provider Office Phone # Fax #    Jamila Perez [phone]       [address] [facility] [address]        Equal Access to Services     YAKELIN ROJO : Hadii aad ku hadasho Soomaali, waaxda luqadaha, qaybta kaalmada adeegyada, waxay idiin haydionten eva norris laelizabeth . So [facility] [phone].    ATENCIÓN: Si habla español, tiene a bonner disposición servicios gratuitos de asistencia lingüística. Llame al [phone].    We comply with applicable federal civil rights laws and [place] laws. We do not discriminate on the basis of race, color, national origin, age, disability, sex, sexual orientation, or gender identity.            Thank you!     Thank you for choosing [facility]  for your care. Our goal is always to provide you with excellent care. Hearing back from our patients is one way we can continue to improve our services. Please take a few minutes to complete the written " survey that you may receive in the mail after your visit with us. Thank you!             Your Updated Medication List - Protect others around you: Learn how to safely use, store and throw away your medicines at www.disposemymeds.org.          This list is accurate as of 3/16/18  3:21 PM.  Always use your most recent med list.                   Brand Name Dispense Instructions for use Diagnosis    levonorgestrel 20 MCG/24HR IUD    MIRENA    1 each    1 each (20 mcg) by Intrauterine route once for 1 dose    Encounter for IUD insertion       MULTIVITAMIN PO      Take  by mouth.

## 2020-03-01 ENCOUNTER — HEALTH MAINTENANCE LETTER (OUTPATIENT)
Age: 40
End: 2020-03-01

## 2020-08-26 ENCOUNTER — TELEPHONE (OUTPATIENT)
Dept: ORTHOPEDICS | Facility: CLINIC | Age: 40
End: 2020-08-26

## 2020-08-26 ENCOUNTER — ANCILLARY PROCEDURE (OUTPATIENT)
Dept: GENERAL RADIOLOGY | Facility: CLINIC | Age: 40
End: 2020-08-26
Attending: FAMILY MEDICINE
Payer: COMMERCIAL

## 2020-08-26 ENCOUNTER — OFFICE VISIT (OUTPATIENT)
Dept: ORTHOPEDICS | Facility: CLINIC | Age: 40
End: 2020-08-26
Payer: COMMERCIAL

## 2020-08-26 ENCOUNTER — OFFICE VISIT (OUTPATIENT)
Dept: FAMILY MEDICINE | Facility: CLINIC | Age: 40
End: 2020-08-26
Payer: COMMERCIAL

## 2020-08-26 VITALS
WEIGHT: 198 LBS | BODY MASS INDEX: 31.08 KG/M2 | SYSTOLIC BLOOD PRESSURE: 118 MMHG | OXYGEN SATURATION: 98 % | HEART RATE: 71 BPM | DIASTOLIC BLOOD PRESSURE: 66 MMHG | TEMPERATURE: 98.2 F | HEIGHT: 67 IN | RESPIRATION RATE: 16 BRPM

## 2020-08-26 VITALS — WEIGHT: 198 LBS | BODY MASS INDEX: 31.08 KG/M2 | HEIGHT: 67 IN

## 2020-08-26 DIAGNOSIS — M25.552 LEFT HIP PAIN: ICD-10-CM

## 2020-08-26 DIAGNOSIS — M25.552 HIP PAIN, LEFT: Primary | ICD-10-CM

## 2020-08-26 DIAGNOSIS — M25.552 LEFT HIP PAIN: Primary | ICD-10-CM

## 2020-08-26 PROCEDURE — 99213 OFFICE O/P EST LOW 20 MIN: CPT | Performed by: NURSE PRACTITIONER

## 2020-08-26 RX ORDER — DICLOFENAC SODIUM 75 MG/1
75 TABLET, DELAYED RELEASE ORAL 2 TIMES DAILY
Qty: 28 TABLET | Refills: 1 | Status: SHIPPED | OUTPATIENT
Start: 2020-08-26 | End: 2021-07-09

## 2020-08-26 ASSESSMENT — MIFFLIN-ST. JEOR
SCORE: 1587.81
SCORE: 1587.81

## 2020-08-26 NOTE — PROGRESS NOTES
"Subjective     Lesli Grey is a 39 year old female who presents to clinic today for the following health issues:    HPI     Chief Complaint   Patient presents with     Hip left     feb off and on       Onset February while at [employer], excessive walking.  This started \"a lot of pain in left hip.\"  Since that time, the pain has been intermittent.  \"the more I walk, the worse it is.\"  Work as a teacher.  She is back at work and with the excessive walking at work, the pain is terrible.  Ibuprofen helps but does not take the pain away.    Pain in left hip, radiates to left knee.  \"it is very much the joint.\"          Review of Systems   Constitutional, HEENT, cardiovascular, pulmonary, GI, , musculoskeletal, neuro, skin, endocrine and psych systems are negative, except as otherwise noted.      Objective    /66 (BP Location: Right arm, Patient Position: Sitting, Cuff Size: Adult Large)   Pulse 71   Temp 98.2  F (36.8  C) (Oral)   Resp 16   Ht 1.689 m (5' 6.5\")   Wt 89.8 kg (198 lb)   SpO2 98%   BMI 31.48 kg/m    Body mass index is 31.48 kg/m .  Physical Exam   GENERAL APPEARANCE: healthy, alert and no distress. Smiling.   SKIN: warm and dry    MS:  ROM of left hip is intact.  CWMS intact to left foot    PSYCH: mentation appears normal and affect normal/bright.  Good eye contact.      Xray - considered but not done today.         Assessment & Plan     (M25.552) Hip pain, left  (primary encounter diagnosis)  Comment: acute/worsening  Plan: Orthopedic & Spine  Referral, [facility],         HAND, AND CHIROPRACTIC REFERRAL          Due to the increasing pain and the urgency of treatment, I referred her to the [facility] orthopedic walk in clinic.  She will call to schedule an appointment and she will follow up as indicated.  Follow up with primary care prn.  Follow up with physical therapy if ortho agrees.  .     BMI:   Estimated body mass index is 31.48 kg/m  as calculated from the " "following:    Height as of this encounter: 1.689 m (5' 6.5\").    Weight as of this encounter: 89.8 kg (198 lb).     Return in about 3 days (around 8/29/2020), or if symptoms worsen or fail to improve.    GUERLINE Marrero [facility]      "

## 2020-08-26 NOTE — TELEPHONE ENCOUNTER
Was recommended by PCP to call ahead to schedule an appt for her L hip pain.  I helped Lesli find a time at 11:20 today.  Informed her of new building policies.     - Syed TRACY ATC

## 2020-08-26 NOTE — PROGRESS NOTES
"      SPORTS & ORTHOPEDIC WALK-IN VISIT 8/26/2020    Primary Care Physician: Dr. Perez    Here for left hip pain. Back in February she went to [employer] and was walking a lot and she started to have a lot of pain in her left anterior hip. Since February the pain has been off and on. The more the she walks the more pain she has. 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 She will get occasional sharp pains while walking otherwise she has a mostly constant ache. The majority of her pain is deep anterior hip.    Reason for visit:     What part of your body is injured / painful?  left hip    What caused the injury /pain? Walking     How long ago did your injury occur or pain begin? several months ago    What are your most bothersome symptoms? Pain    How would you characterize your symptom?  aching and sharp    What makes your symptoms better? Ibuprofen    What makes your symptoms worse? Standing, Walking, Sitting and Movement    Have you been previously seen for this problem? Yes, FP    Medical History:    Any recent changes to your medical history? No    Any new medication prescribed since last visit? No    Have you had surgery on this body part before? No    Social History:    Occupation: Teacher    Handedness: Right    Exercise: 1-2 days/week    Review of Systems:    Do you have fever, chills, weight loss? No    Do you have any vision problems? No    Do you have any chest pain or edema? No    Do you have any shortness of breath or wheezing?  No    Do you have stomach problems? No    Do you have any numbness or focal weakness? No    Do you have diabetes? No    Do you have problems with bleeding or clotting? No    Do you have an rashes or other skin lesions? No       Past Medical History, Current Medications, and Allergies are reviewed in the electronic medical record as appropriate.       EXAM:Ht 1.689 m (5' 6.5\")   Wt 89.8 kg (198 lb)   BMI 31.48 kg/m  "     Exam:  Patient is alert, in no acute distress, pleasant and conversational.    Gait: Nonantalgic.  Normal heel toe gait    Standing Exam:  Lumbar spine AROM normal.     left Hip:  Supine PROM:  Flexion: Approximately 115 , no tenderness.  External rotation: approximately 60 , no tenderness.  Internal rotation: Approximately 30 , no tenderness  No subjective pain reported with range of motion testing. ROM IS symmetric with uninjured side     Strength Testing:  Hip flexion: 5/5.  Hip adduction: 5/5.  Hip abduction: 4+/5.  No subjective pain reported with strength testing. Strength IS symmetric with uninjured side.     Palpation:  negative tenderness to palpation over the greater trochanter.  negative tenderness to palpation over psoas  negative tenderness to palpation over ASIS  negative tenderness to palpation over Iliac crest    Special Tests:  negative Gerson's test.   negative JERRY's test  negative FADIR's test  positive  Scour test  negative Reported pain with resisted hip flexion to opposite shoulder     Neurovascularly intact in bilateral lower extremities        Imagin view x-rays of the AP pelvis and left lateral hip are performed and reviewed independently demonstrating cam deformity of the left femoral neck.  No acute fracture or dislocation.  See EMR for formal radiology report.      Assessment: Patient is a 39 year old female with suspected SALOME of the left hip    Recommendations:   Reviewed imaging and assessment with the patient in detail  Recommended course of diclofenac  Discussed activity modifications  And provided with home exercises and encouraged follow-up with physical therapy.  PT referral was previously given.  Could consider ultrasound-guided intra-articular steroid injection if pain persist.    Serge Enriquez MD

## 2020-10-01 ENCOUNTER — THERAPY VISIT (OUTPATIENT)
Dept: PHYSICAL THERAPY | Facility: CLINIC | Age: 40
End: 2020-10-01
Payer: COMMERCIAL

## 2020-10-01 DIAGNOSIS — M25.552 HIP PAIN, LEFT: ICD-10-CM

## 2020-10-01 PROCEDURE — 97110 THERAPEUTIC EXERCISES: CPT | Mod: GP | Performed by: PHYSICAL THERAPIST

## 2020-10-01 PROCEDURE — 97161 PT EVAL LOW COMPLEX 20 MIN: CPT | Mod: GP | Performed by: PHYSICAL THERAPIST

## 2020-10-02 ASSESSMENT — ACTIVITIES OF DAILY LIVING (ADL)
HOS_ADL_ITEM_SCORE_TOTAL: 40
GOING_DOWN_1_FLIGHT_OF_STAIRS: MODERATE DIFFICULTY
HOW_WOULD_YOU_RATE_YOUR_CURRENT_LEVEL_OF_FUNCTION_DURING_YOUR_USUAL_ACTIVITIES_OF_DAILY_LIVING_FROM_0_TO_100_WITH_100_BEING_YOUR_LEVEL_OF_FUNCTION_PRIOR_TO_YOUR_HIP_PROBLEM_AND_0_BEING_THE_INABILITY_TO_PERFORM_ANY_OF_YOUR_USUAL_DAILY_ACTIVITIES?: 50
GOING_UP_1_FLIGHT_OF_STAIRS: MODERATE DIFFICULTY
STANDING_FOR_15_MINUTES: MODERATE DIFFICULTY
HOS_ADL_COUNT: 17
HOS_ADL_SCORE(%): 58.82
RECREATIONAL_ACTIVITIES: MODERATE DIFFICULTY
WALKING_DOWN_STEEP_HILLS: MODERATE DIFFICULTY
WALKING_15_MINUTES_OR_GREATER: EXTREME DIFFICULTY
WALKING_INITIALLY: NO DIFFICULTY AT ALL
WALKING_APPROXIMATELY_10_MINUTES: MODERATE DIFFICULTY
DEEP_SQUATTING: MODERATE DIFFICULTY
STEPPING_UP_AND_DOWN_CURBS: MODERATE DIFFICULTY
HEAVY_WORK: MODERATE DIFFICULTY
SITTING_FOR_15_MINUTES: SLIGHT DIFFICULTY
GETTING_INTO_AND_OUT_OF_AN_AVERAGE_CAR: NO DIFFICULTY AT ALL
LIGHT_TO_MODERATE_WORK: MODERATE DIFFICULTY
HOS_ADL_HIGHEST_POTENTIAL_SCORE: 68
ROLLING_OVER_IN_BED: NO DIFFICULTY AT ALL
WALKING_UP_STEEP_HILLS: MODERATE DIFFICULTY
PUTTING_ON_SOCKS_AND_SHOES: SLIGHT DIFFICULTY
TWISTING/PIVOTING_ON_INVOLVED_LEG: MODERATE DIFFICULTY
GETTING_INTO_AND_OUT_OF_A_BATHTUB: SLIGHT DIFFICULTY

## 2020-10-02 NOTE — PROGRESS NOTES
[facility] Initial Evaluation  Subjective:    Therapist Generated HPI Evaluation  Problem details: Pt presents to PT with a chief complaint of L hip and knee pain with reported onset in 02/2020 due to increased walking and standing while on a family [place] trip. Primary pain identified at R anterior hip and groin and pain worse with prolonged walking, squats, pivoting/twisting, and stairs. Pt diagnosed with SALOME and referred to PT. Pt has since developed some anterior knee pain attributed to her abnormal gait mechanics. .         Type of problem:  Right hip.    This is a new condition.  Condition occurred with:  Repetition/overuse.  Where condition occurred: in the community.  Patient reports pain:  Anterior and groin.  Pain is described as sharp and is intermittent.  Pain radiates to:  Thigh. Pain is worse in the P.M..  Since onset symptoms are unchanged.  Associated symptoms:  Loss of motion/stiffness and loss of strength. Symptoms are exacerbated by ascending stairs, bending/squatting, descending stairs, walking and standing  and relieved by NSAID's.  Special tests included:  X-ray.    Restrictions due to condition include:  Working in normal job without restrictions.  Barriers include:  None as reported by patient.    Patient Health History         Pain is reported as 4/10 on pain scale.  General health as reported by patient is good.  Pertinent medical history includes: overweight.   Red flags:  None as reported by patient.  Medical allergies: none.   Surgeries include:  None.    Current medications:  Anti-inflammatory.    Occupation: Teacher.   Primary job tasks include:  Computer work and prolonged sitting.                                    Objective:    Gait:  Mild lateral trunk lean                                                        Hip Evaluation  Hip PROM:    Flexion: Left: 110*   Right: 110        Internal Rotation: Left: 10*    Right: 30  External Rotation: Left: 60    Right:  60      Pain: pain w/  L hip IR and L hip Flexion         Hip Strength:    Flexion:   Left: 4+/5   ++  Pain:  Right: 5/5   Pain:                    Extension:  Left: 4+/5  ++  Pain:Right: 5/5    Pain:    Abduction:  Left: 4/5    ++   Pain:Right: 5-/5    Pain:      External Rotation:  Left: 4/5   Pain:  Right: 4+/5   Pain:              Hip Palpation:    Left hip tenderness present at:   hip flexors         Knee Evaluation:  ROM:  AROM: normal                Special Tests:   Left knee positive for the following special tests:  Patellar Compression    Palpation:    Left knee tenderness present at:  Patellar Inferior        Functional Testing:          Quad:    Single Leg Squat:  Left:     Pain 3/10  Moderate loss of control and femoral IR  Right:        Bilateral Leg Squat:                General     ROS    Assessment/Plan:    Patient is a 39 year old female with left side hip complaints.    Patient has the following significant findings with corresponding treatment plan.                Diagnosis 1:  L hip pain  Pain -  manual therapy, STS, splint/taping/bracing/orthotics, self management and education  Decreased ROM/flexibility - manual therapy and therapeutic exercise  Decreased strength - therapeutic exercise and therapeutic activities  Impaired muscle performance - neuro re-education    Therapy Evaluation Codes:   Cumulative Therapy Evaluation is: Low complexity.    Previous and current functional limitations:  (See Goal Flow Sheet for this information)    Short term and Long term goals: (See Goal Flow Sheet for this information)     Communication ability:  Patient appears to be able to clearly communicate and understand verbal and written communication and follow directions correctly.  Treatment Explanation - The following has been discussed with the patient:   RX ordered/plan of care  Anticipated outcomes  Possible risks and side effects  This patient would benefit from PT intervention to resume normal  activities.   Rehab potential is good.    Frequency:  2 X a month, once daily  Duration:  for 2 months  Discharge Plan:  Achieve all LTG.  Independent in home treatment program.  Reach maximal therapeutic benefit.    Please refer to the daily flowsheet for treatment today, total treatment time and time spent performing 1:1 timed codes.

## 2020-12-14 ENCOUNTER — HEALTH MAINTENANCE LETTER (OUTPATIENT)
Age: 40
End: 2020-12-14

## 2021-04-17 ENCOUNTER — HEALTH MAINTENANCE LETTER (OUTPATIENT)
Age: 41
End: 2021-04-17

## 2021-06-16 ENCOUNTER — OFFICE VISIT (OUTPATIENT)
Dept: PEDIATRICS | Facility: CLINIC | Age: 41
End: 2021-06-16
Payer: COMMERCIAL

## 2021-06-16 ENCOUNTER — ANCILLARY PROCEDURE (OUTPATIENT)
Dept: GENERAL RADIOLOGY | Facility: CLINIC | Age: 41
End: 2021-06-16
Attending: NURSE PRACTITIONER
Payer: COMMERCIAL

## 2021-06-16 VITALS
TEMPERATURE: 98.5 F | HEIGHT: 66 IN | HEART RATE: 71 BPM | RESPIRATION RATE: 12 BRPM | OXYGEN SATURATION: 98 % | WEIGHT: 208.5 LBS | BODY MASS INDEX: 33.51 KG/M2 | DIASTOLIC BLOOD PRESSURE: 70 MMHG | SYSTOLIC BLOOD PRESSURE: 108 MMHG

## 2021-06-16 DIAGNOSIS — M79.672 HEEL PAIN, BILATERAL: ICD-10-CM

## 2021-06-16 DIAGNOSIS — M79.671 HEEL PAIN, BILATERAL: ICD-10-CM

## 2021-06-16 DIAGNOSIS — M25.562 CHRONIC PAIN OF LEFT KNEE: ICD-10-CM

## 2021-06-16 DIAGNOSIS — G89.29 CHRONIC PAIN OF LEFT KNEE: ICD-10-CM

## 2021-06-16 DIAGNOSIS — G89.29 CHRONIC PAIN OF LEFT KNEE: Primary | ICD-10-CM

## 2021-06-16 DIAGNOSIS — M25.562 CHRONIC PAIN OF LEFT KNEE: Primary | ICD-10-CM

## 2021-06-16 DIAGNOSIS — M25.579 PAIN IN JOINT, ANKLE AND FOOT, UNSPECIFIED LATERALITY: ICD-10-CM

## 2021-06-16 PROCEDURE — 99214 OFFICE O/P EST MOD 30 MIN: CPT | Performed by: NURSE PRACTITIONER

## 2021-06-16 PROCEDURE — 73562 X-RAY EXAM OF KNEE 3: CPT | Mod: LT | Performed by: RADIOLOGY

## 2021-06-16 SDOH — HEALTH STABILITY: MENTAL HEALTH: HOW OFTEN DO YOU HAVE 6 OR MORE DRINKS ON ONE OCCASION?: NOT ASKED

## 2021-06-16 SDOH — HEALTH STABILITY: MENTAL HEALTH: HOW MANY STANDARD DRINKS CONTAINING ALCOHOL DO YOU HAVE ON A TYPICAL DAY?: 1 OR 2

## 2021-06-16 SDOH — HEALTH STABILITY: MENTAL HEALTH: HOW OFTEN DO YOU HAVE A DRINK CONTAINING ALCOHOL?: 2-4 TIMES A MONTH

## 2021-06-16 ASSESSMENT — MIFFLIN-ST. JEOR: SCORE: 1622.5

## 2021-06-16 NOTE — PROGRESS NOTES
"    Assessment & Plan     Chronic pain of left knee  To see ortho due to duration and severity of pain. Plans to see [facility], will let me know if not in network.  - XR Knee Left 3 Views; Future  - Orthopedic & Spine  Referral; Future    Pain in joint, ankle and foot, unspecified laterality  Heel pain, bilateral  For now will trial ice and NSAIDs x1-2 weeks  - Orthopedic & Spine  Referral; Future             BMI:   Estimated body mass index is 33.65 kg/m  as calculated from the following:    Height as of this encounter: 1.676 m (5' 6\").    Weight as of this encounter: 94.6 kg (208 lb 8 oz).           Return if symptoms worsen or fail to improve.    Pura Gonzalez NP  [facility] JONATHAN Ballesteros is a 40 year old who presents for the following health issues     HPI     Musculoskeletal problem/pain  Onset/Duration: Sept. 2020 off and on  Description  Location: foot and knee - bilateral and left knee  Joint Swelling: YES  Redness: no  Pain: YES  Warmth: no  Intensity:  mild, moderate, severe  Progression of Symptoms:  worsening  Accompanying signs and symptoms:   Fevers: no  Numbness/tingling/weakness: no  History  Trauma to the area: no  Recent illness:  no  Previous similar problem: no  Previous evaluation:  no  Precipitating or alleviating factors:  Aggravating factors include: standing, walking, climbing stairs and overuse  Therapies tried and outcome: Ibuprofen and tried new shoes    Hx of left hip pain that has since resolved, wonders if modifying gait due to hip pain lead to left knee pain.  No swelling, deformity, redness, warmth of knee or feet  Experiencing left knee pain, heel pain, and arch pain   Knee pain seems to get better with activity, feels stiff  Foot pain hurts with walking. Has tried supportive shoes, doesn't help.    Review of Systems   Otherwise ROS is negative except as stated above.        Objective    /70 (BP Location: Right arm, Patient " "Position: Chair, Cuff Size: Adult Regular)   Pulse 71   Temp 98.5  F (36.9  C) (Tympanic)   Resp 12   Ht 1.676 m (5' 6\")   Wt 94.6 kg (208 lb 8 oz)   SpO2 98%   BMI 33.65 kg/m    Body mass index is 33.65 kg/m .  Physical Exam  Musculoskeletal:      Left knee: She exhibits decreased range of motion (flexion). She exhibits no swelling, no effusion, no deformity and no erythema. No tenderness found.      Feet: b/l heels mid  to palpation but no obvious swelling,deformity, no ankle pain, full ROM  GENERAL: healthy, alert and no distress  SKIN: no suspicious lesions or rashes  PSYCH: mentation appears normal, affect normal/bright    Xr Knee Left 3 Views    Result Date: 6/16/2021  KNEE LEFT THREE VIEWS   6/16/2021 12:15 PM HISTORY:  Chronic pain of left knee     IMPRESSION: Joint effusion. There may be mild narrowing at the medial aspect of the medial compartment. This could be further assessed with bilateral Pringle view if indicated clinically. No fracture identified. CLAUDIA SMITH MD            "

## 2021-06-16 NOTE — PATIENT INSTRUCTIONS
X--ray of your knee on the way out  -Can try regular use of Advil/Ibuprofen the next week  -Ice  -Schedule with orthopedic specialist to discuss your knee and feet (check with your insurance for coverage with [facility])

## 2021-07-09 ENCOUNTER — OFFICE VISIT (OUTPATIENT)
Dept: PEDIATRICS | Facility: CLINIC | Age: 41
End: 2021-07-09
Payer: COMMERCIAL

## 2021-07-09 VITALS
BODY MASS INDEX: 33.61 KG/M2 | HEIGHT: 66 IN | SYSTOLIC BLOOD PRESSURE: 125 MMHG | OXYGEN SATURATION: 98 % | DIASTOLIC BLOOD PRESSURE: 75 MMHG | HEART RATE: 68 BPM | TEMPERATURE: 97.8 F | WEIGHT: 209.1 LBS

## 2021-07-09 DIAGNOSIS — Z00.00 ROUTINE GENERAL MEDICAL EXAMINATION AT A HEALTH CARE FACILITY: Primary | ICD-10-CM

## 2021-07-09 DIAGNOSIS — M72.2 PLANTAR FASCIITIS: ICD-10-CM

## 2021-07-09 DIAGNOSIS — M25.562 LEFT KNEE PAIN, UNSPECIFIED CHRONICITY: ICD-10-CM

## 2021-07-09 DIAGNOSIS — Z13.220 SCREENING FOR HYPERLIPIDEMIA: ICD-10-CM

## 2021-07-09 DIAGNOSIS — Z12.4 SCREENING FOR MALIGNANT NEOPLASM OF CERVIX: ICD-10-CM

## 2021-07-09 LAB
ANION GAP SERPL CALCULATED.3IONS-SCNC: 2 MMOL/L (ref 3–14)
BUN SERPL-MCNC: 11 MG/DL (ref 7–30)
CALCIUM SERPL-MCNC: 7.6 MG/DL (ref 8.5–10.1)
CHLORIDE SERPL-SCNC: 108 MMOL/L (ref 94–109)
CHOLEST SERPL-MCNC: 193 MG/DL
CO2 SERPL-SCNC: 28 MMOL/L (ref 20–32)
CREAT SERPL-MCNC: 0.8 MG/DL (ref 0.52–1.04)
GFR SERPL CREATININE-BSD FRML MDRD: >90 ML/MIN/{1.73_M2}
GLUCOSE SERPL-MCNC: 102 MG/DL (ref 70–99)
HBA1C MFR BLD: 5.8 % (ref 0–5.6)
HDLC SERPL-MCNC: 56 MG/DL
LDLC SERPL CALC-MCNC: 105 MG/DL
NONHDLC SERPL-MCNC: 137 MG/DL
POTASSIUM SERPL-SCNC: 3.8 MMOL/L (ref 3.4–5.3)
SODIUM SERPL-SCNC: 138 MMOL/L (ref 133–144)
TRIGL SERPL-MCNC: 160 MG/DL

## 2021-07-09 PROCEDURE — 83036 HEMOGLOBIN GLYCOSYLATED A1C: CPT | Performed by: NURSE PRACTITIONER

## 2021-07-09 PROCEDURE — 80061 LIPID PANEL: CPT | Performed by: NURSE PRACTITIONER

## 2021-07-09 PROCEDURE — 36415 COLL VENOUS BLD VENIPUNCTURE: CPT | Performed by: NURSE PRACTITIONER

## 2021-07-09 PROCEDURE — 87624 HPV HI-RISK TYP POOLED RSLT: CPT | Performed by: NURSE PRACTITIONER

## 2021-07-09 PROCEDURE — 99396 PREV VISIT EST AGE 40-64: CPT | Performed by: NURSE PRACTITIONER

## 2021-07-09 PROCEDURE — G0145 SCR C/V CYTO,THINLAYER,RESCR: HCPCS | Performed by: NURSE PRACTITIONER

## 2021-07-09 PROCEDURE — 80048 BASIC METABOLIC PNL TOTAL CA: CPT | Performed by: NURSE PRACTITIONER

## 2021-07-09 ASSESSMENT — ENCOUNTER SYMPTOMS
HEMATURIA: 0
HEADACHES: 1
COUGH: 0
ABDOMINAL PAIN: 0
FREQUENCY: 0
DYSURIA: 0
SORE THROAT: 0
CONSTIPATION: 0
CHILLS: 0
MYALGIAS: 1
NAUSEA: 0
SHORTNESS OF BREATH: 0
BREAST MASS: 0
JOINT SWELLING: 1
DIARRHEA: 0
DIZZINESS: 0
PALPITATIONS: 0
WEAKNESS: 0
HEARTBURN: 0
NERVOUS/ANXIOUS: 0
PARESTHESIAS: 0
EYE PAIN: 0
FEVER: 0
ARTHRALGIAS: 1
HEMATOCHEZIA: 0

## 2021-07-09 ASSESSMENT — MIFFLIN-ST. JEOR: SCORE: 1625.22

## 2021-07-09 NOTE — PROGRESS NOTES
SUBJECTIVE:   CC: Lesli Grey is an 40 year old woman who presents for preventive health visit.       Patient has been advised of split billing requirements and indicates understanding: Yes  Healthy Habits:     Getting at least 3 servings of Calcium per day:  Yes    Bi-annual eye exam:  Yes    Dental care twice a year:  NO    Sleep apnea or symptoms of sleep apnea:  None    Diet:  Regular (no restrictions)    Frequency of exercise:  None    Taking medications regularly:  Yes    Medication side effects:  Not applicable    PHQ-2 Total Score: 0    Additional concerns today:  Yes (podiatry referral )    Saw ortho at 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 Yes    Have you ever done Advance Care Planning? (For example, a Health Directive, POLST, or a discussion with a medical provider or your loved ones about your wishes): No, advance care planning information given to patient to review.  Patient plans to discuss their wishes with loved ones or provider.      Social History     Tobacco Use     Smoking status: Never Smoker     Smokeless tobacco: Never Used   Substance Use Topics     Alcohol use: Yes     Frequency: 2-4 times a month     Drinks per session: 1 or 2     If you drink alcohol do you typically have >3 drinks per day or >7 drinks per week? No    No flowsheet data found.    Reviewed orders with patient.  Reviewed health maintenance and updated orders accordingly - Yes  Lab work is in process  Labs  reviewed in EPIC  BP Readings from Last 3 Encounters:   07/09/21 125/75   06/16/21 108/70   08/26/20 118/66    Wt Readings from Last 3 Encounters:   07/09/21 94.8 kg (209 lb 1.6 oz)   06/16/21 94.6 kg (208 lb 8 oz)   08/26/20 89.8 kg (198 lb)                  Patient Active Problem List   Diagnosis     CARDIOVASCULAR SCREENING; LDL GOAL LESS THAN 160     Encounter for IUD insertion     Hip pain, left     Past Surgical History:   Procedure Laterality Date     HC INSERTION INTRAUTERINE DEVICE  9-11-12    Mirena     HC LEVONORGESTREL IU 52MG 5 YR  08/15/2017     TONSILLECTOMY  1988       Social History     Tobacco Use     Smoking status: Never Smoker     Smokeless tobacco: Never Used   Substance Use Topics     Alcohol use: Yes     Frequency: 2-4 times a month     Drinks per session: 1 or 2     Family History   Problem Relation Age of Onset     Cancer Maternal Grandmother          Current Outpatient Medications   Medication Sig Dispense Refill     blood glucose monitoring (TERI CONTOUR NEXT) test strip Use to test blood sugar 1-2 times daily or as directed. 50 strip 3     blood glucose monitoring (TERI MICROLET) lancets Use to test blood sugar 1-2 times daily or as directed. 100 each 3     levonorgestrel (MIRENA) 20 MCG/24HR IUD 1 each by Intrauterine route once       Multiple Vitamin (MULTIVITAMIN OR) Take  by mouth.         Breast Cancer Screening:  Any new diagnosis of family breast, ovarian, or bowel cancer? No    FHS-7: No flowsheet data found.  click delete button to remove this line now  Mammogram Screening - Offered annual screening and updated Health Maintenance for mutual plan based on risk factor consideration    Pertinent mammograms are reviewed under the imaging tab.    History of abnormal Pap smear: NO - age 30-65 PAP every 5 years with negative HPV co-testing recommended  PAP / HPV Latest Ref Rng & Units 4/21/2016 9/11/2012 4/1/2011   PAP - NIL NIL NIL   HPV 16 DNA NEG Negative - -   HPV 18 DNA NEG  "Negative - -   OTHER HR HPV NEG Negative - -     Reviewed and updated as needed this visit by clinical staff                 Reviewed and updated as needed this visit by Provider                Past Medical History:   Diagnosis Date     NO ACTIVE PROBLEMS         Review of Systems   Constitutional: Negative for chills and fever.   HENT: Negative for congestion, ear pain, hearing loss and sore throat.    Eyes: Negative for pain and visual disturbance.   Respiratory: Negative for cough and shortness of breath.    Cardiovascular: Negative for chest pain, palpitations and peripheral edema.   Gastrointestinal: Negative for abdominal pain, constipation, diarrhea, heartburn, hematochezia and nausea.   Breasts:  Negative for breast mass and discharge.   Genitourinary: Negative for dysuria, frequency, genital sores, hematuria, pelvic pain, urgency, vaginal bleeding and vaginal discharge.   Musculoskeletal: Positive for arthralgias, joint swelling and myalgias.   Skin: Negative for rash.   Neurological: Positive for headaches. Negative for dizziness, weakness and paresthesias.   Psychiatric/Behavioral: Negative for mood changes. The patient is not nervous/anxious.           OBJECTIVE:   /75 (BP Location: Right arm, Patient Position: Chair, Cuff Size: Adult Regular)   Pulse 68   Temp 97.8  F (36.6  C) (Tympanic)   Ht 1.676 m (5' 6\")   Wt 94.8 kg (209 lb 1.6 oz)   SpO2 98%   BMI 33.75 kg/m    Physical Exam  GENERAL: healthy, alert and no distress  EYES: Eyes grossly normal to inspection, PERRL and conjunctivae and sclerae normal  HENT: ear canals and TM's normal  NECK: no adenopathy, no asymmetry, masses, or scars and thyroid normal to palpation  RESP: lungs clear to auscultation - no rales, rhonchi or wheezes  CV: regular rate and rhythm, normal S1 S2, no S3 or S4, no murmur, click or rub, no peripheral edema and peripheral pulses strong  ABDOMEN: soft, nontender, no hepatosplenomegaly, no masses and bowel sounds " "normal  MS: no gross musculoskeletal defects noted, no edema  : Normal external genitalia, no meatus, normal vaginal mucosa and cervix  SKIN: no suspicious lesions or rashes  NEURO: Normal strength and tone, mentation intact and speech normal  PSYCH: mentation appears normal, affect normal/bright    Diagnostic Test Results:  Labs reviewed in Epic    ASSESSMENT/PLAN:   1. Routine general medical examination at a health care facility  - Basic metabolic panel  (Ca, Cl, CO2, Creat, Gluc, K, Na, BUN)  - Hemoglobin A1c    2. Screening for hyperlipidemia  - Lipid panel reflex to direct LDL Fasting    3. Screening for malignant neoplasm of cervix  - Pap imaged thin layer screen with HPV - recommended age 30 - 65 years (select HPV order below)  - HPV High Risk Types DNA Cervical    4. Plantar fasciitis  - Orthopedic  Referral; Future    5. Left knee pain, unspecified chronicity  - [facility] RE[facility]ERR[facility]; Future    Patient has been advised of split billing requirements and indicates understanding: Yes  COUNSELING:  Reviewed preventive health counseling, as reflected in patient instructions       Regular exercise       Healthy diet/nutrition    Estimated body mass index is 33.65 kg/m  as calculated from the following:    Height as of 6/16/21: 1.676 m (5' 6\").    Weight as of 6/16/21: 94.6 kg (208 lb 8 oz).        She reports that she has never smoked. She has never used smokeless tobacco.      Counseling Resources:  ATP IV Guidelines  Pooled Cohorts Equation Calculator  Breast Cancer Risk Calculator  BRCA-Related Cancer Risk Assessment: FHS-7 Tool  FRAX Risk Assessment  ICSI Preventive Guidelines  Dietary Guidelines for Americans, 2010  USDA's MyPlate  ASA Prophylaxis  Lung CA Screening    GUERLINE [facility]  "

## 2021-07-09 NOTE — PATIENT INSTRUCTIONS
Schedule with podiatry    I placed referral to physical therapy at our clinic        Preventive Health Recommendations  Female Ages 40 to 49    Yearly exam:     See your health care provider every year in order to  1. Review health changes.   2. Discuss preventive care.    3. Review your medicines if your doctor prescribed any.      Get a Pap test every three years (unless you have an abnormal result and your provider advises testing more often).      If you get Pap tests with HPV test, you only need to test every 5 years, unless you have an abnormal result. You do not need a Pap test if your uterus was removed (hysterectomy) and you have not had cancer.      You should be tested each year for STDs (sexually transmitted diseases), if you're at risk.     Ask your doctor if you should have a mammogram.      Have a colonoscopy (test for colon cancer) if someone in your family has had colon cancer or polyps before age 50.       Have a cholesterol test every 5 years.       Have a diabetes test (fasting glucose) after age 45. If you are at risk for diabetes, you should have this test every 3 years.    Shots: Get a flu shot each year. Get a tetanus shot every 10 years.     Nutrition:     Eat at least 5 servings of fruits and vegetables each day.    Eat whole-grain bread, whole-wheat pasta and brown rice instead of white grains and rice.    Get adequate Calcium and Vitamin D.      Lifestyle    Exercise at least 150 minutes a week (an average of 30 minutes a day, 5 days a week). This will help you control your weight and prevent disease.    Limit alcohol to one drink per day.    No smoking.     Wear sunscreen to prevent skin cancer.    See your dentist every six months for an exam and cleaning.

## 2021-07-14 LAB
COPATH REPORT: NORMAL
FINAL DIAGNOSIS: NORMAL
HPV HR 12 DNA CVX QL NAA+PROBE: NEGATIVE
HPV16 DNA SPEC QL NAA+PROBE: NEGATIVE
HPV18 DNA SPEC QL NAA+PROBE: NEGATIVE
PAP: NORMAL
SPECIMEN DESCRIPTION: NORMAL
SPECIMEN SOURCE CVX/VAG CYTO: NORMAL

## 2021-10-02 ENCOUNTER — HEALTH MAINTENANCE LETTER (OUTPATIENT)
Age: 41
End: 2021-10-02

## 2022-09-03 ENCOUNTER — HEALTH MAINTENANCE LETTER (OUTPATIENT)
Age: 42
End: 2022-09-03

## 2022-09-12 ENCOUNTER — E-VISIT (OUTPATIENT)
Dept: FAMILY MEDICINE | Facility: CLINIC | Age: 42
End: 2022-09-12
Payer: COMMERCIAL

## 2022-09-12 DIAGNOSIS — J02.9 SORE THROAT: Primary | ICD-10-CM

## 2022-09-12 PROCEDURE — 99421 OL DIG E/M SVC 5-10 MIN: CPT | Performed by: NURSE PRACTITIONER

## 2023-01-12 ENCOUNTER — MYC MEDICAL ADVICE (OUTPATIENT)
Dept: FAMILY MEDICINE | Facility: CLINIC | Age: 43
End: 2023-01-12

## 2023-01-12 NOTE — TELEPHONE ENCOUNTER
Rena:  Pended derm referral     My father has had various treatments for skin cancer and I have a similar lifestyle. I'd like to establish care with a dermatologist. I have various moles and sun damage.    ADY Lemus RN  [facility]

## 2023-01-15 DIAGNOSIS — Z80.8 FAMILY HISTORY OF SKIN CANCER: Primary | ICD-10-CM

## 2023-09-30 ENCOUNTER — HEALTH MAINTENANCE LETTER (OUTPATIENT)
Age: 43
End: 2023-09-30

## 2023-12-09 ENCOUNTER — HEALTH MAINTENANCE LETTER (OUTPATIENT)
Age: 43
End: 2023-12-09

## 2024-01-08 ASSESSMENT — ENCOUNTER SYMPTOMS
FREQUENCY: 0
BREAST MASS: 0
HEMATURIA: 0
HEADACHES: 1
FEVER: 0
EYE PAIN: 0
NERVOUS/ANXIOUS: 0
HEMATOCHEZIA: 0
CHILLS: 0
SORE THROAT: 0
DIARRHEA: 0
CONSTIPATION: 0
HEARTBURN: 0
COUGH: 0
MYALGIAS: 1
PALPITATIONS: 0
SHORTNESS OF BREATH: 0
JOINT SWELLING: 1
ARTHRALGIAS: 1
PARESTHESIAS: 1
WEAKNESS: 1
DYSURIA: 0
NAUSEA: 1
DIZZINESS: 0
ABDOMINAL PAIN: 0

## 2024-01-11 ENCOUNTER — OFFICE VISIT (OUTPATIENT)
Dept: PEDIATRICS | Facility: CLINIC | Age: 44
End: 2024-01-11
Payer: COMMERCIAL

## 2024-01-11 VITALS
RESPIRATION RATE: 20 BRPM | TEMPERATURE: 97.4 F | HEART RATE: 87 BPM | WEIGHT: 229.1 LBS | BODY MASS INDEX: 36.82 KG/M2 | DIASTOLIC BLOOD PRESSURE: 70 MMHG | SYSTOLIC BLOOD PRESSURE: 118 MMHG | HEIGHT: 66 IN | OXYGEN SATURATION: 96 %

## 2024-01-11 DIAGNOSIS — R74.01 ELEVATED ALT MEASUREMENT: ICD-10-CM

## 2024-01-11 DIAGNOSIS — E66.812 CLASS 2 OBESITY DUE TO EXCESS CALORIES WITHOUT SERIOUS COMORBIDITY WITH BODY MASS INDEX (BMI) OF 36.0 TO 36.9 IN ADULT: ICD-10-CM

## 2024-01-11 DIAGNOSIS — E66.09 CLASS 2 OBESITY DUE TO EXCESS CALORIES WITHOUT SERIOUS COMORBIDITY WITH BODY MASS INDEX (BMI) OF 36.0 TO 36.9 IN ADULT: ICD-10-CM

## 2024-01-11 DIAGNOSIS — Z12.11 SCREEN FOR COLON CANCER: ICD-10-CM

## 2024-01-11 DIAGNOSIS — Z12.31 VISIT FOR SCREENING MAMMOGRAM: Primary | ICD-10-CM

## 2024-01-11 DIAGNOSIS — Z00.00 ROUTINE GENERAL MEDICAL EXAMINATION AT A HEALTH CARE FACILITY: ICD-10-CM

## 2024-01-11 DIAGNOSIS — R73.03 PREDIABETES: ICD-10-CM

## 2024-01-11 DIAGNOSIS — Z13.220 LIPID SCREENING: ICD-10-CM

## 2024-01-11 DIAGNOSIS — M62.89 MUSCLE FATIGUE: ICD-10-CM

## 2024-01-11 DIAGNOSIS — E83.52 SERUM CALCIUM ELEVATED: ICD-10-CM

## 2024-01-11 DIAGNOSIS — M79.10 MYALGIA: ICD-10-CM

## 2024-01-11 LAB
ALBUMIN SERPL BCG-MCNC: 4.6 G/DL (ref 3.5–5.2)
ALP SERPL-CCNC: 142 U/L (ref 40–150)
ALT SERPL W P-5'-P-CCNC: 60 U/L (ref 0–50)
ANION GAP SERPL CALCULATED.3IONS-SCNC: 12 MMOL/L (ref 7–15)
AST SERPL W P-5'-P-CCNC: 27 U/L (ref 0–45)
BILIRUB SERPL-MCNC: 0.4 MG/DL
BUN SERPL-MCNC: 15.5 MG/DL (ref 6–20)
CALCIUM SERPL-MCNC: 10.3 MG/DL (ref 8.6–10)
CHLORIDE SERPL-SCNC: 103 MMOL/L (ref 98–107)
CHOLEST SERPL-MCNC: 242 MG/DL
CK SERPL-CCNC: 83 U/L (ref 26–192)
CREAT SERPL-MCNC: 0.69 MG/DL (ref 0.51–0.95)
DEPRECATED HCO3 PLAS-SCNC: 25 MMOL/L (ref 22–29)
EGFRCR SERPLBLD CKD-EPI 2021: >90 ML/MIN/1.73M2
ERYTHROCYTE [DISTWIDTH] IN BLOOD BY AUTOMATED COUNT: 13.5 % (ref 10–15)
ERYTHROCYTE [SEDIMENTATION RATE] IN BLOOD BY WESTERGREN METHOD: 11 MM/HR (ref 0–20)
FASTING STATUS PATIENT QL REPORTED: NO
GLUCOSE SERPL-MCNC: 101 MG/DL (ref 70–99)
HBA1C MFR BLD: 6.1 % (ref 0–5.6)
HCT VFR BLD AUTO: 43.4 % (ref 35–47)
HDLC SERPL-MCNC: 49 MG/DL
HGB BLD-MCNC: 13.8 G/DL (ref 11.7–15.7)
LDLC SERPL CALC-MCNC: 148 MG/DL
MCH RBC QN AUTO: 27.1 PG (ref 26.5–33)
MCHC RBC AUTO-ENTMCNC: 31.8 G/DL (ref 31.5–36.5)
MCV RBC AUTO: 85 FL (ref 78–100)
NONHDLC SERPL-MCNC: 193 MG/DL
PLATELET # BLD AUTO: 225 10E3/UL (ref 150–450)
POTASSIUM SERPL-SCNC: 4.4 MMOL/L (ref 3.4–5.3)
PROT SERPL-MCNC: 7.5 G/DL (ref 6.4–8.3)
RBC # BLD AUTO: 5.09 10E6/UL (ref 3.8–5.2)
RHEUMATOID FACT SERPL-ACNC: <10 IU/ML
SODIUM SERPL-SCNC: 140 MMOL/L (ref 135–145)
TRIGL SERPL-MCNC: 225 MG/DL
TSH SERPL DL<=0.005 MIU/L-ACNC: 2.94 UIU/ML (ref 0.3–4.2)
VIT D+METAB SERPL-MCNC: 20 NG/ML (ref 20–50)
WBC # BLD AUTO: 6.6 10E3/UL (ref 4–11)

## 2024-01-11 PROCEDURE — 86038 ANTINUCLEAR ANTIBODIES: CPT | Performed by: PHYSICIAN ASSISTANT

## 2024-01-11 PROCEDURE — 80061 LIPID PANEL: CPT | Performed by: PHYSICIAN ASSISTANT

## 2024-01-11 PROCEDURE — 83036 HEMOGLOBIN GLYCOSYLATED A1C: CPT | Performed by: PHYSICIAN ASSISTANT

## 2024-01-11 PROCEDURE — 85652 RBC SED RATE AUTOMATED: CPT | Performed by: PHYSICIAN ASSISTANT

## 2024-01-11 PROCEDURE — 99396 PREV VISIT EST AGE 40-64: CPT | Performed by: PHYSICIAN ASSISTANT

## 2024-01-11 PROCEDURE — 99214 OFFICE O/P EST MOD 30 MIN: CPT | Performed by: PHYSICIAN ASSISTANT

## 2024-01-11 PROCEDURE — 82306 VITAMIN D 25 HYDROXY: CPT | Performed by: PHYSICIAN ASSISTANT

## 2024-01-11 PROCEDURE — 85027 COMPLETE CBC AUTOMATED: CPT | Performed by: PHYSICIAN ASSISTANT

## 2024-01-11 PROCEDURE — 80053 COMPREHEN METABOLIC PANEL: CPT | Performed by: PHYSICIAN ASSISTANT

## 2024-01-11 PROCEDURE — 36415 COLL VENOUS BLD VENIPUNCTURE: CPT | Performed by: PHYSICIAN ASSISTANT

## 2024-01-11 PROCEDURE — 86200 CCP ANTIBODY: CPT | Performed by: PHYSICIAN ASSISTANT

## 2024-01-11 PROCEDURE — 86431 RHEUMATOID FACTOR QUANT: CPT | Performed by: PHYSICIAN ASSISTANT

## 2024-01-11 PROCEDURE — 82550 ASSAY OF CK (CPK): CPT | Performed by: PHYSICIAN ASSISTANT

## 2024-01-11 PROCEDURE — 84443 ASSAY THYROID STIM HORMONE: CPT | Performed by: PHYSICIAN ASSISTANT

## 2024-01-11 ASSESSMENT — ENCOUNTER SYMPTOMS
CONSTIPATION: 0
PARESTHESIAS: 1
SORE THROAT: 0
HEARTBURN: 0
BREAST MASS: 0
COUGH: 0
MYALGIAS: 1
FEVER: 0
SHORTNESS OF BREATH: 0
DYSURIA: 0
ABDOMINAL PAIN: 0
DIARRHEA: 0
NAUSEA: 1
HEMATOCHEZIA: 0
EYE PAIN: 0
NERVOUS/ANXIOUS: 0
FREQUENCY: 0
ARTHRALGIAS: 1
HEMATURIA: 0
PALPITATIONS: 0
WEAKNESS: 1
CHILLS: 0
DIZZINESS: 0
JOINT SWELLING: 1
HEADACHES: 1

## 2024-01-11 ASSESSMENT — PAIN SCALES - GENERAL: PAINLEVEL: MILD PAIN (2)

## 2024-01-11 NOTE — PROGRESS NOTES
SUBJECTIVE:   Lesli is a 43 year old, presenting for the following:  Physical        1/11/2024     9:13 AM   Additional Questions   Roomed by Ton Morales   Accompanied by N/A         1/11/2024     9:13 AM   Patient Reported Additional Medications   Patient reports taking the following new medications No       Healthy Habits:     Getting at least 3 servings of Calcium per day:  Yes    Bi-annual eye exam:  Yes    Dental care twice a year:  NO    Sleep apnea or symptoms of sleep apnea:  None    Diet:  Regular (no restrictions)    Frequency of exercise:  None    Taking medications regularly:  Yes    Medication side effects:  Not applicable    Additional concerns today:  Yes    Concern full body inflammation pain and fatigue has been going on since October.   Today's PHQ-2 Score:       1/11/2024     9:06 AM   PHQ-2 ( 1999 [employer])   Q1: Little interest or pleasure in doing things 1   Q2: Feeling down, depressed or hopeless 1   PHQ-2 Score 2   Q1: Little interest or pleasure in doing things Several days   Q2: Feeling down, depressed or hopeless Several days   PHQ-2 Score 2   Notes symptoms when she has extreme inflammation and fatigue.    Social History     Tobacco Use    Smoking status: Never    Smokeless tobacco: Never   Substance Use Topics    Alcohol use: Yes             1/8/2024    10:19 AM   Alcohol Use   Prescreen: >3 drinks/day or >7 drinks/week? No     Reviewed orders with patient.  Reviewed health maintenance and updated orders accordingly - Yes  Lab work is in process    Breast Cancer Screening:    FHS-7:       1/8/2024     1:39 PM   Breast CA Risk Assessment (FHS-7)   Did any of your first-degree relatives have breast or ovarian cancer? No   Did any of your relatives have bilateral breast cancer? Unknown   Did any man in your family have breast cancer? No   Did any woman in your family have breast and ovarian cancer? Unknown   Did any woman in your family have breast cancer before age 50 y? Unknown    Do you have 2 or more relatives with breast and/or ovarian cancer? No   Do you have 2 or more relatives with breast and/or bowel cancer? No         Pertinent mammograms are reviewed under the imaging tab.    History of abnormal Pap smear:       Latest Ref Rng & Units 7/9/2021     7:26 AM 7/9/2021     7:25 AM 4/21/2016     4:55 PM   PAP / HPV   PAP (Historical)  NIL      HPV 16 DNA NEG^Negative  Negative  Negative    HPV 18 DNA NEG^Negative  Negative  Negative    Other HR HPV NEG^Negative  Negative  Negative      Reviewed and updated as needed this visit by clinical staff   Tobacco  Allergies  Meds    Surg Hx  Fam Hx          Reviewed and updated as needed this visit by Provider   Tobacco   Meds    Surg Hx  Fam Hx             Review of Systems   Constitutional:  Negative for chills and fever.   HENT:  Positive for hearing loss. Negative for congestion, ear pain and sore throat.    Eyes:  Negative for pain and visual disturbance.   Respiratory:  Negative for cough and shortness of breath.    Cardiovascular:  Negative for chest pain, palpitations and peripheral edema.   Gastrointestinal:  Positive for nausea. Negative for abdominal pain, constipation, diarrhea, heartburn and hematochezia.   Breasts:  Negative for tenderness, breast mass and discharge.   Genitourinary:  Negative for dysuria, frequency, genital sores, hematuria, pelvic pain, urgency, vaginal bleeding and vaginal discharge.   Musculoskeletal:  Positive for arthralgias, joint swelling and myalgias.   Skin:  Negative for rash.   Neurological:  Positive for weakness, headaches and paresthesias. Negative for dizziness.   Psychiatric/Behavioral:  Negative for mood changes. The patient is not nervous/anxious.        1. Has been noting muscle weakness, inflammation. She notes body aches like she has the flu. Skin hurts to touch. Clothes bother her. Symptoms upper, lower extremities and bilateral. Forearms, legs. Skin sensitivity is all over.  "  Symptoms started 10/2023. Increasing in frequency. Now almost daily.  No new stressors.  Not anxious or depressed.  Frustrated with her symptoms and is teary eyed when discussing.  Sleep- good, feels restful. Rest however does not improve symptoms.   Tried to pay attention to diet, has not seen correlation with diet.  Hx of joint pain. Less of an issue in the last few months.   Using ibuprofen 2400 mg per day- makes her pain bearable.  No family hx of similar.    No joint redness, swelling or warmth. No rash  No fever associated.  No uri, st, sob, cp symptoms.    Not triggered by activity.  Overall she is active, does not do intentional exercise, but is not sedentary.    Covid hx- 8/2022- no long covid sxs associated.      Has IUD. No periods. Does not feel she has any of the typical perimenopausal.         OBJECTIVE:   /70 (BP Location: Right arm, Patient Position: Sitting, Cuff Size: Adult Large)   Pulse 87   Temp 97.4  F (36.3  C) (Tympanic)   Resp 20   Ht 1.676 m (5' 6\")   Wt 103.9 kg (229 lb 1.6 oz)   SpO2 96%   BMI 36.98 kg/m    Physical Exam  GENERAL: healthy, alert and no distress  EYES: Eyes grossly normal to inspection, PERRL and conjunctivae and sclerae normal  HENT: ear canals and TM's normal, nose and mouth without ulcers or lesions  NECK: no adenopathy, no asymmetry, masses, or scars and thyroid normal to palpation  RESP: lungs clear to auscultation - no rales, rhonchi or wheezes  CV: regular rate and rhythm, normal S1 S2, no S3 or S4, no murmur, click or rub, no peripheral edema and peripheral pulses strong  ABDOMEN: soft, nontender, no hepatosplenomegaly, no masses and bowel sounds normal  MS: tenderness to palpation biceps, forearms, thighs. No redness or warmth.   SKIN: no suspicious lesions or rashes  NEURO: Normal strength and tone, mentation intact and speech normal  PSYCH: mentation appears normal, affect normal/bright        ASSESSMENT/PLAN:   Lesli was seen today for " physical.    Diagnoses and all orders for this visit:    Visit for screening mammogram  -     MA SCREENING DIGITAL BILAT - Future  (s+30); Future    Screen for colon cancer  -     Colonoscopy Screening  Referral; Future    Routine general medical examination at a health care facility    Myalgia  -     TSH with free T4 reflex; Future  -     Comprehensive metabolic panel (BMP + Alb, Alk Phos, ALT, AST, Total. Bili, TP); Future  -     Vitamin D Deficiency; Future  -     ESR: Erythrocyte sedimentation rate; Future  -     Rheumatoid factor; Future  -     Anti Nuclear Julienne IgG by IFA with Reflex; Future  -     CK total; Future  -     Cyclic Citrullinated Peptide Antibody IgG; Future  -     CBC with platelets; Future  -     TSH with free T4 reflex  -     Comprehensive metabolic panel (BMP + Alb, Alk Phos, ALT, AST, Total. Bili, TP)  -     Vitamin D Deficiency  -     ESR: Erythrocyte sedimentation rate  -     Rheumatoid factor  -     Anti Nuclear Julienne IgG by IFA with Reflex  -     CK total  -     Cyclic Citrullinated Peptide Antibody IgG  -     CBC with platelets  Has upper and lower extremity myalgia with no reason. It is bilateral. Not associated with any other symptoms other then muscle fatigue. Denies depression, anxiety, trauma.   Work up started. If neg, referral to rheumatology.  Muscle fatigue  -     Comprehensive metabolic panel (BMP + Alb, Alk Phos, ALT, AST, Total. Bili, TP); Future  -     Vitamin D Deficiency; Future  -     ESR: Erythrocyte sedimentation rate; Future  -     Rheumatoid factor; Future  -     Anti Nuclear Julienne IgG by IFA with Reflex; Future  -     CK total; Future  -     Cyclic Citrullinated Peptide Antibody IgG; Future  -     CBC with platelets; Future  -     Comprehensive metabolic panel (BMP + Alb, Alk Phos, ALT, AST, Total. Bili, TP)  -     Vitamin D Deficiency  -     ESR: Erythrocyte sedimentation rate  -     Rheumatoid factor  -     Anti Nuclear Julienne IgG by IFA with Reflex  -     CK  "total  -     Cyclic Citrullinated Peptide Antibody IgG  -     CBC with platelets    Lipid screening  -     Lipid panel reflex to direct LDL Non-fasting; Future  -     Lipid panel reflex to direct LDL Non-fasting    Prediabetes  -     Hemoglobin A1c; Future  -     Hemoglobin A1c    Class 2 obesity due to excess calories w/o serious comorbidity.  Continue to work on heart healthy diet and exercise.     Other orders  -     PRIMARY CARE FOLLOW-UP SCHEDULING; Future              COUNSELING:  Reviewed preventive health counseling, as reflected in patient instructions      BMI:   Estimated body mass index is 36.98 kg/m  as calculated from the following:    Height as of this encounter: 1.676 m (5' 6\").    Weight as of this encounter: 103.9 kg (229 lb 1.6 oz).   Weight management plan: Discussed healthy diet and exercise guidelines      She reports that she has never smoked. She has never used smokeless tobacco.          Melany Rivera PA-C  [facility] JONATHAN  "

## 2024-01-12 LAB
ANA SER QL IF: NEGATIVE
CCP AB SER IA-ACNC: 1 U/ML

## 2024-01-16 ENCOUNTER — MYC MEDICAL ADVICE (OUTPATIENT)
Dept: PEDIATRICS | Facility: CLINIC | Age: 44
End: 2024-01-16
Payer: COMMERCIAL

## 2024-01-16 DIAGNOSIS — N63.21 MASS OF UPPER OUTER QUADRANT OF LEFT BREAST: ICD-10-CM

## 2024-01-16 DIAGNOSIS — M79.10 MYALGIA: Primary | ICD-10-CM

## 2024-01-16 DIAGNOSIS — M62.89 MUSCLE FATIGUE: ICD-10-CM

## 2024-01-17 ENCOUNTER — TELEPHONE (OUTPATIENT)
Dept: PEDIATRICS | Facility: CLINIC | Age: 44
End: 2024-01-17

## 2024-01-17 NOTE — TELEPHONE ENCOUNTER
"JARRETT Mosley,    As I informed you in our quick huddle this morning, this patient came to Imaging for a screening mammogram.  The patient stated she has a left breast lump present for a \"couple of months\".  The patient can feel the lump today.  The patient points to the LUQ as the position of the lump.    The patient had an appointment with you on 01/11/2024 and she stated that she did not mention the lump at that time.    Could you please place orders for:    Bilateral Diagnostic mammogram     Left breast Ultrasound    I told the patient that you would place the order and then [facility] would call her in a day or 2 to schedule.    I wrote this information down for the patient and gave her a handout with the phone number to scheduling and told her to call and schedule if she does not hear in 2 days.    The patient was also instructed to call the clinic if she had any questions.    Keila PUGH (R)(M)  [facility]  [phone]    "

## 2024-01-27 ENCOUNTER — LAB (OUTPATIENT)
Dept: LAB | Facility: CLINIC | Age: 44
End: 2024-01-27
Payer: COMMERCIAL

## 2024-01-27 DIAGNOSIS — R74.01 ELEVATED ALT MEASUREMENT: ICD-10-CM

## 2024-01-27 DIAGNOSIS — E83.52 SERUM CALCIUM ELEVATED: ICD-10-CM

## 2024-01-27 PROCEDURE — 80053 COMPREHEN METABOLIC PANEL: CPT

## 2024-01-27 PROCEDURE — 36415 COLL VENOUS BLD VENIPUNCTURE: CPT

## 2024-01-28 LAB
ALBUMIN SERPL BCG-MCNC: 4.6 G/DL (ref 3.5–5.2)
ALP SERPL-CCNC: 120 U/L (ref 40–150)
ALT SERPL W P-5'-P-CCNC: 36 U/L (ref 0–50)
ANION GAP SERPL CALCULATED.3IONS-SCNC: 10 MMOL/L (ref 7–15)
AST SERPL W P-5'-P-CCNC: 25 U/L (ref 0–45)
BILIRUB SERPL-MCNC: 0.2 MG/DL
BUN SERPL-MCNC: 12.2 MG/DL (ref 6–20)
CALCIUM SERPL-MCNC: 9.9 MG/DL (ref 8.6–10)
CHLORIDE SERPL-SCNC: 102 MMOL/L (ref 98–107)
CREAT SERPL-MCNC: 0.69 MG/DL (ref 0.51–0.95)
DEPRECATED HCO3 PLAS-SCNC: 27 MMOL/L (ref 22–29)
EGFRCR SERPLBLD CKD-EPI 2021: >90 ML/MIN/1.73M2
GLUCOSE SERPL-MCNC: 100 MG/DL (ref 70–99)
POTASSIUM SERPL-SCNC: 4.5 MMOL/L (ref 3.4–5.3)
PROT SERPL-MCNC: 7 G/DL (ref 6.4–8.3)
SODIUM SERPL-SCNC: 139 MMOL/L (ref 135–145)

## 2024-04-25 ENCOUNTER — OFFICE VISIT (OUTPATIENT)
Dept: RHEUMATOLOGY | Facility: CLINIC | Age: 44
End: 2024-04-25
Attending: PHYSICIAN ASSISTANT
Payer: COMMERCIAL

## 2024-04-25 VITALS
HEART RATE: 75 BPM | HEIGHT: 66 IN | SYSTOLIC BLOOD PRESSURE: 137 MMHG | OXYGEN SATURATION: 98 % | BODY MASS INDEX: 37.28 KG/M2 | WEIGHT: 232 LBS | DIASTOLIC BLOOD PRESSURE: 80 MMHG

## 2024-04-25 DIAGNOSIS — M79.10 MYALGIA: ICD-10-CM

## 2024-04-25 DIAGNOSIS — B27.00 POSITIVE TEST FOR EPSTEIN-BARR VIRUS (EBV): Primary | ICD-10-CM

## 2024-04-25 DIAGNOSIS — M62.89 MUSCLE FATIGUE: ICD-10-CM

## 2024-04-25 PROCEDURE — 99205 OFFICE O/P NEW HI 60 MIN: CPT | Performed by: INTERNAL MEDICINE

## 2024-04-25 RX ORDER — PREDNISONE 5 MG/1
TABLET ORAL
Qty: 30 TABLET | Refills: 2 | Status: SHIPPED | OUTPATIENT
Start: 2024-04-25

## 2024-04-25 ASSESSMENT — PAIN SCALES - GENERAL: PAINLEVEL: MILD PAIN (3)

## 2024-04-25 NOTE — PROGRESS NOTES
Rheumatology Clinic Visit  [facility]  Jackson De Los Santos M.D.     Lesli Grey MRN# [id number]   YOB: 1980 Age: 43 year old   Date of Visit: 04/25/2024  Primary care provider: Melany Rivera          Assessment and Plan:     # Chronic myalgia and fatigue, onset :   Patient relates 6 months of waxing and waning, but overall worsening diffuse myalgia, marked fatigue and loss of energy. Exam shows 20# weight increase since 7-2021; neurologic, musculoskeletal, cutaneous exams are entirely normal with the exception of mild pain events at extremes of left hip range of motion.    Data: January 2024, comprehensive metabolic panel and CBC were normal.Sedimentation rate was 11; antinuclear antibody was negative; rheumatoid factor negative; cyclic citrullinated peptide antibodies negative hemoglobin A1c mildly elevated at 6.1; TSH normal.  Imaging: Knee x-ray left showed joint effusion in 2021 with mild medial compartment narrowing.  Left hip in 2020 showed changed femoral head offset, question cam morphology; moderate to severe osteitis pubis    Discussion: There is no convincing evidence for a recognized rheumatic or autoimmune condition, infection, or hidden malignancy.  Possibilities include chronic viral infection, chronic fatigue syndrome, atypical fibromyalgia associated with disrupted sleep, or postviral syndrome.  I agree with workup pursued by primary care to date.  I recommend addition of several viral serologies, and repetition of inflammatory markers.  I recommend a brief course of low-dose corticosteroids to assess potential responsiveness to immuno modulatory medication.  If steroids provide significant relief, but symptoms return dramatically after steroid discontinuation, a course of longer acting immunosuppressive medication such as methotrexate, for systemic inflammatory response, not otherwise specified, will be considered.    Plan:  1.  Check serologies for Sonia-Barr  virus, cytomegalovirus, renew inflammatory marker, check urinalysis.  2.  Prednisone 20 mg once daily for 7 days, then 15 mg daily for 7 days.  Report to rheumatology immediately if adverse effects are noted.  If beneficial, report to rheumatology at the end of the steroid course.  3.  Monitor sleep quality and quantity;  Determine whether formal sleep study may be beneficial in working to better understand the cause of chronic fatigue and muscle pain. Plan resume graded exercise program with aerobic exercise and resistance training components.   4.  Consider infectious disease consultation for consideration of chronic fatigue syndrome should above measures result and no improvement of symptoms.    RTC TBD    Jackson De Los Santos MD  Staff Rheumatologist, [facility]    On the day of the encounter, a total of 65 minutes was spent in chart review, and in counseling and coordination of care, regarding the patient's complex medical problem of chronic myalgia, chronic fatigue, osteoarthritis, left knee, mild, cam morphology, left hip.    Orders Placed This Encounter   Procedures    Erythrocyte sedimentation rate auto    CRP inflammation    EBV Capsid Antibody IgG    EBV Capsid Antibody IgM    CMV antibody IgM    Routine UA with micro reflex to culture    CK total            History of Present Illness:   Lesli Grey presents for evaluation of myalgia and muscle fatigue.  Referred by provider Brothers.    Initial history April 25, 2024:    Patient was seen by primary provider Brothers on January 11, 2024.  Impression was of bilateral extremity myalgia and muscle fatigue of unclear cause, onset October 2023.  History of joint pain swelling was also elicited.  Workup for metabolic, inflammatory, and rheumatic causes was begun.    Today, she reports that she developed L hip pain after a trip to [place] in 2020. She saw Orthopedics, was told that she might have arthritis. Over 8-10 months, pain gradually decreased. In summer of  "2020, she developed L knee pain, worse with walking. That pain remains, and is constant; she feels it lying in bed, also when acgtive.    In , she developed body aches, \"like I had a fever\"; +skin sensitivity, diffuse muscles aches. 600 mg ibu 4 times a day helped, but she could not miss a dose due to marked recurrence in pain. She notices pain most in the mornings, but not consistent.There is no consistent daytime predominance associated with muscle pain, activity does not reproducibly worsen discomfort.She is tearful when she describes out continued muscle pain and fatigue have made major impact on her quality of life and reduced her capacity for activities of daily living since that time. Before symptoms began in Fall 2023, she enjoyed gardening, yard work, activity. Now, after she cleans dishes after dinner, she is exhausted. She has tried increasing activity with swimming 3X per week 30-40 minutes. She became only more exhausted. Muscle pain was worse through feb/mar 2024. She got COVID in 3-2024, and with infection, she noted surprisingly improvement in pain.  Prior to March 2024, she last had documented COVID infection in 2022.      She has noted \"bone tired\" fatigue, developing in , waxing and waning. Normal activity (moving around at work) has been adversely affected by fatigue. Fatigue has worsened in last 3 weeks.    Resting does not provide much relief; knee pain responds to being less active and to elevation.     She has tried dietary modification; but cycling of symptoms is more weekly in periodicity, she does not notice worsening/improvement associated with daily activity or workdays vs weekends.  She has not used tylenol, states that Tylenol has never been helpful for recurrent headaches.    Appetite has not changed. Dietary habits have not shifted, she stays well-hydrated. No documented fevers despite symptoms.   +stable chronic frontal HA, ibu helps.  +recurrent nasal staph " "infections.  No swollen glands, trouble swallowing  No CP, cough, SOB, Abd pain.   + constipation with ibuprofen.   No urinary symptoms.    No recent change in medications. She has stable IUD. Does not use herbal supplements.     She reports generally restorative sleep. She does have \"restless legs\" relieved by stretching. She does think that nocturnal awakenings are more frequent; +nocturia. No trouble initiating sleep. Spouse reports occasional snoring.  She has missed 7-8 days of work due to msk symptoms, but she has changed work habits--not able to pursue physical aspects of job recently.           Review of Systems:     Constitutional: negative  Skin: negative  Eyes: negative  Ears/Nose/Throat: negative  Respiratory: No shortness of breath, dyspnea on exertion, cough, or hemoptysis  Cardiovascular: negative  Gastrointestinal: negative  Genitourinary: negative  Musculoskeletal: negative  Neurologic: negative  Psychiatric: negative  Hematologic/Lymphatic/Immunologic: negative  Endocrine: negative         Active Problem List:     Patient Active Problem List    Diagnosis Date Noted    Hip pain, left 08/26/2020     Priority: Medium    Encounter for IUD insertion 08/15/2017     Priority: Medium     mirena placed August 15, 2017        CARDIOVASCULAR SCREENING; LDL GOAL LESS THAN 160 04/01/2011     Priority: Medium            Past Medical History:     Past Medical History:   Diagnosis Date    NO ACTIVE PROBLEMS      Past Surgical History:   Procedure Laterality Date    HC INSERTION INTRAUTERINE DEVICE  9-11-12    Mirena    HC LEVONORGESTREL IU 52MG 5 YR  08/15/2017    TONSILLECTOMY  1988            Social History:     Social History     Socioeconomic History    Marital status:      Spouse name: Foster    Number of children: 2    Years of education: Not on file    Highest education level: Not on file   Occupational History    Occupation: asst teacher     Employer: [employer]   Tobacco Use    " Smoking status: Never    Smokeless tobacco: Never   Vaping Use    Vaping status: Never Used   Substance and Sexual Activity    Alcohol use: Yes     Comment: 0-1 per week    Drug use: No    Sexual activity: Yes     Partners: Male     Birth control/protection: I.U.D.     Comment: Mirena placed 8-15-17,remove 8-15-23   Other Topics Concern    Parent/sibling w/ CABG, MI or angioplasty before 65F 55M? No   Social History Narrative    .    Two children 15, 11 y.o daughters    Work- teaching support staff     Social Determinants of Health     Financial Resource Strain: Low Risk  (1/8/2024)    Financial Resource Strain     Within the past 12 months, have you or your family members you live with been unable to get utilities (heat, electricity) when it was really needed?: No   Food Insecurity: Low Risk  (1/8/2024)    Food Insecurity     Within the past 12 months, did you worry that your food would run out before you got money to buy more?: No     Within the past 12 months, did the food you bought just not last and you didn t have money to get more?: No   Transportation Needs: Low Risk  (1/8/2024)    Transportation Needs     Within the past 12 months, has lack of transportation kept you from medical appointments, getting your medicines, non-medical meetings or appointments, work, or from getting things that you need?: No   Physical Activity: Not on file   Stress: Not on file   Social Connections: Not on file   Interpersonal Safety: Low Risk  (1/11/2024)    Interpersonal Safety     Do you feel physically and emotionally safe where you currently live?: Yes     Within the past 12 months, have you been hit, slapped, kicked or otherwise physically hurt by someone?: No     Within the past 12 months, have you been humiliated or emotionally abused in other ways by your partner or ex-partner?: No   Housing Stability: Low Risk  (1/8/2024)    Housing Stability     Do you have housing? : Yes     Are you worried about losing your  "housing?: No     Non-smoker.  Works full-time as a  in elementary school.  Drinks 5 beers per 6 months       Family History:     Family History   Problem Relation Age of Onset    Cancer Maternal Grandmother     Breast Cancer Maternal Grandmother     Coronary Artery Disease Father     Hypertension Father     Hyperlipidemia Brother             Allergies:   No Known Allergies         Medications:     Current Outpatient Medications   Medication Sig Dispense Refill    levonorgestrel (MIRENA) 20 MCG/24HR IUD 1 each by Intrauterine route once      Multiple Vitamin (MULTIVITAMIN OR) Take  by mouth.      predniSONE (DELTASONE) 5 MG tablet Take 4 tabs daily for 1 week, then take 3 tabs daily for 1 week 30 tablet 2            Physical Exam:   Blood pressure 137/80, pulse 75, height 1.676 m (5' 6\"), weight 105.2 kg (232 lb), SpO2 98%, not currently breastfeeding.  Wt Readings from Last 6 Encounters:   04/25/24 105.2 kg (232 lb)   01/11/24 103.9 kg (229 lb 1.6 oz)   07/09/21 94.8 kg (209 lb 1.6 oz)   06/16/21 94.6 kg (208 lb 8 oz)   08/26/20 89.8 kg (198 lb)   08/26/20 89.8 kg (198 lb)     Body mass index is 37.45 kg/m .  Constitutional: well-developed, appearing stated age; moderate obesity.  Eyes: nl EOM, PERRLA, vision, conjunctiva, sclera  ENT: nl external ears, nose, hearing, lips, teeth, gums, throat  No mucous membrane lesions, normal saliva pool  Neck: no mass or thyroid enlargement  Resp: lungs clear to auscultation, nl to palpation  CV: RRR, no murmurs, rubs or gallops, no edema  GI: no ABD mass or tenderness, no HSM  : not tested  Lymph: no cervical, supraclavicular, inguinal or epitrochlear nodes  MS: The TMJ, neck, shoulder, elbow, wrist, MCP/PIP/DIP, spine, hip, knee, ankle, and foot MTP/IP joints were examined and found normal. No active synovitis or altered joint anatomy. Full joint ROM. Normal  strength. No dactylitis,  tenosynovitis, enthesopathy.  Skin: no nail pitting, alopecia, rash, " nodules or lesions  Neuro: nl cranial nerves, strength, sensation, DTRs.   Psych: nl judgement, orientation, memory, affect.         Data:     @      Latest Ref Rng & Units 7/9/2021     7:40 AM 1/11/2024    10:32 AM 1/27/2024    12:55 PM   RHEUM RESULTS   Albumin 3.5 - 5.2 g/dL  4.6  4.6    ALT 0 - 50 U/L  60  36    AST 0 - 45 U/L  27  25    SAMMIE interpretation Negative  Negative     CK Total 26 - 192 U/L  83     Creatinine 0.51 - 0.95 mg/dL 0.80  0.69  0.69    GFR Estimate If Black >60 mL/min/[1.73_m2] >90      GFR Estimate >60 mL/min/1.73m2 >90  >90  >90    Hematocrit 35.0 - 47.0 %  43.4     Hemoglobin 11.7 - 15.7 g/dL  13.8     WBC 4.0 - 11.0 10e3/uL  6.6     RBC Count 3.80 - 5.20 10e6/uL  5.09     RDW 10.0 - 15.0 %  13.5     MCHC 31.5 - 36.5 g/dL  31.8     MCV 78 - 100 fL  85     Platelet Count 150 - 450 10e3/uL  225     Sed Rate 0 - 20 mm/hr  11         Rheumatoid Factor   Date Value Ref Range Status   01/11/2024 <10 <14 IU/mL Final   ,   Cyclic Citrullinated Peptide Antibody IgG   Date Value Ref Range Status   01/11/2024 1.0 <7.0 U/mL Final     Comment:     Negative   ,  ,  ,  ,  ,   SAMMIE interpretation   Date Value Ref Range Status   01/11/2024 Negative Negative Final     Comment:       Negative:              <1:40  Borderline Positive:   1:40 - 1:80  Positive:              >1:80   ,  ,  ,  ,  ,  ,  ,  ,  ,   Hep B Surface Agn   Date Value Ref Range Status   12/26/2011 Negative NEG Final   ,  ,  ,  ,  ,  ,  ,  ,  ,  ,  ,  ,  ,  ,  ,  ,  ,  ,  ,  ,  ,  ,  ,  ,  ,  ,  ,  ,  ,

## 2024-04-25 NOTE — PATIENT INSTRUCTIONS
Diagnosis:  1.  Chronic myalgia and fatigue: There is no clear-cut evidence for a recognized rheumatic or autoimmune condition, infection, or hidden malignancy.  Possibilities include chronic viral infection, chronic fatigue syndrome, atypical fibromyalgia associated with disrupted sleep, or postviral syndrome.  I recommend a brief course of low-dose corticosteroids to assess possible responsiveness to immuno modulatory medication.  If steroids provide significant relief, but symptoms return dramatically after steroid discontinuation, a course of longer acting immunosuppressive medication such as methotrexate could be tried.    Plan:  1.  Check serologies for Sonia-Barr virus, cytomegalovirus, renew inflammatory marker, check urinalysis.  2.  Prednisone 20 mg once daily for 7 days, then 15 mg daily for 7 days.  Report to rheumatology immediately if adverse effects are noted.  If beneficial, report to rheumatology at the end of the steroid course.  3.  Monitor sleep quality and quantity; resume graded exercise program with aerobic exercise and resistance training components.  Determine whether formal sleep study may be beneficial in working to better understand the cause of chronic fatigue and muscle pain.

## 2024-04-25 NOTE — NURSING NOTE
"Chief Complaint   Patient presents with    New Patient     Myalgia  Muscle fatigue         Vitals:    04/25/24 1355   BP: 137/80   BP Location: Left arm   Patient Position: Sitting   Cuff Size: Adult Large   Pulse: 75   SpO2: 98%   Weight: 105.2 kg (232 lb)   Height: 1.676 m (5' 6\")       Body mass index is 37.45 kg/m .      ARI Welch    "

## 2024-04-27 ENCOUNTER — LAB (OUTPATIENT)
Dept: LAB | Facility: CLINIC | Age: 44
End: 2024-04-27
Payer: COMMERCIAL

## 2024-04-27 DIAGNOSIS — M79.10 MYALGIA: ICD-10-CM

## 2024-04-27 LAB
ALBUMIN UR-MCNC: NEGATIVE MG/DL
APPEARANCE UR: CLEAR
BACTERIA #/AREA URNS HPF: ABNORMAL /HPF
BILIRUB UR QL STRIP: NEGATIVE
COLOR UR AUTO: YELLOW
ERYTHROCYTE [SEDIMENTATION RATE] IN BLOOD BY WESTERGREN METHOD: 13 MM/HR (ref 0–20)
GLUCOSE UR STRIP-MCNC: NEGATIVE MG/DL
HGB UR QL STRIP: NEGATIVE
KETONES UR STRIP-MCNC: ABNORMAL MG/DL
LEUKOCYTE ESTERASE UR QL STRIP: ABNORMAL
NITRATE UR QL: NEGATIVE
PH UR STRIP: 5.5 [PH] (ref 5–7)
RBC #/AREA URNS AUTO: ABNORMAL /HPF
SP GR UR STRIP: 1.02 (ref 1–1.03)
SQUAMOUS #/AREA URNS AUTO: ABNORMAL /LPF
UROBILINOGEN UR STRIP-ACNC: 0.2 E.U./DL
WBC #/AREA URNS AUTO: ABNORMAL /HPF

## 2024-04-27 PROCEDURE — 86645 CMV ANTIBODY IGM: CPT

## 2024-04-27 PROCEDURE — 36415 COLL VENOUS BLD VENIPUNCTURE: CPT

## 2024-04-27 PROCEDURE — 85652 RBC SED RATE AUTOMATED: CPT

## 2024-04-27 PROCEDURE — 86665 EPSTEIN-BARR CAPSID VCA: CPT

## 2024-04-27 PROCEDURE — 81001 URINALYSIS AUTO W/SCOPE: CPT

## 2024-04-27 PROCEDURE — 82550 ASSAY OF CK (CPK): CPT

## 2024-04-27 PROCEDURE — 86140 C-REACTIVE PROTEIN: CPT

## 2024-04-28 LAB
CK SERPL-CCNC: 83 U/L (ref 26–192)
CRP SERPL-MCNC: <3 MG/L

## 2024-04-29 LAB
CMV IGM SERPL IA-ACNC: 12.6 AU/ML
CMV IGM SERPL IA-ACNC: NEGATIVE
EBV VCA IGG SER IA-ACNC: >750 U/ML
EBV VCA IGG SER IA-ACNC: POSITIVE
EBV VCA IGM SER IA-ACNC: 59.1 U/ML
EBV VCA IGM SER IA-ACNC: ABNORMAL

## 2024-05-02 ENCOUNTER — LAB (OUTPATIENT)
Dept: LAB | Facility: CLINIC | Age: 44
End: 2024-05-02
Payer: COMMERCIAL

## 2024-05-02 DIAGNOSIS — B27.00 POSITIVE TEST FOR EPSTEIN-BARR VIRUS (EBV): ICD-10-CM

## 2024-05-02 PROCEDURE — 36415 COLL VENOUS BLD VENIPUNCTURE: CPT

## 2024-05-02 PROCEDURE — 87799 DETECT AGENT NOS DNA QUANT: CPT

## 2024-05-03 LAB — EBV DNA SERPL NAA+PROBE-ACNC: NOT DETECTED IU/ML

## 2024-05-29 NOTE — PROGRESS NOTES
"[facility]  General Infectious Disease/HIV Clinic Note: New Patient     Patient:  Lesli Grey, Date of birth 1980   Medical record number [id number]  Date of Visit:  05/30/2024   Consult requested by Dr. Jackson De Los Santos for evaluation of chronic fatigue, EBV.    Video: 7:59 - 8:53     Assessment       Myalgias  Fatigue  Hyperalgesia  Joint pains  Effusion of L knee in 6/2021  Prediabetes  HLD  Obesity  EBV exposure in the past    Overall, I do not see evidence of a specific ongoing infectious process that explains Ms. Grey's symptoms. However, there are a few more to evaluate for that can cause non-specific and chronic symptoms, and her symptoms could certainly have been triggered by an initial viral infection.     First, Ms. Grey has never been tested or treated for Lyme disease. Lyme is endemic in this area and she does spend a good amount of time outdoors. Her history of monoarticular arthritis with effusion could be consistent, and there are descriptions of \"subtle encephalopathy\" with late Lyme. Serologies will be helpful here as she has not previously been treated, though we discussed that antibiotics might not take away all of her symptoms.     Second, acute EBV is certainly a possibility for her initial infection. It can cause fatigue and nonspecific symptoms that persist for weeks to months. Unfortunately, her testing was not complete, nor performed at the time of symptom onset (which would have been most helpful for accurate diagnosis). We discussed EBV serologies at length and that IgMs are notoriously inaccurate, and the most reliable EBV serology is the EBNA (IgG to the nuclear antigen) which turns positive after the first few weeks of infection and persists life-long. The other serologies wax and wane and cannot be reliably used. At this point, however, her EBNA IgG would be positive if she had an infection in October or if she had one 5 years ago, so repeating " these serologies will not be helpful in diagnosing the acuity of her EBV infection. We also discussed that EBV is in the herpesvirus family, integrates into B cells, and can reactivate with acute stressors. It is not recommended to treat EBV reactivation (partially because we do not have good antivirals to do so) so continuing to test or recommending empiric treatment would be inappropriate.     Third, Ms. Grey is in an age-appropriate category for menopause, and I do wonder if some of her symptoms could be attributable to this. This is difficult to clinically diagnose as she does not have periods on her IUD, but we discussed sending some hormone levels to help categorize this. I will also send a message to her PCP regarding this.     With all of the above being said, another reasonable explanation would be chronic fatigue syndrome vs fibromyalgia. Will defer to PCP and Rheumatology for further workup and management of these entities as they are not infectious.      Recommendations     Recommend screening for Lyme (total Ab w/ reflex), Treponemal Ab (reflex to RPR)  Recommend evaluation for menopause (will send prolactin and FSH as she recently had TSH wnl)  Discussed reducing ibuprofen dose as able to avoid tolerance and toxicities.   Will alert PCP to the above.     RTC: PRN pending the above results. Will communicate results via N42.     I spent a total of 91 minutes on the day of the visit.   Time spent by me doing chart review, history and exam, documentation and further activities per the note    Shantell Felder MD  Pager [phone]  Infectious Diseases      History of the Infectious Disease lllness:   CC: Fatigue    HPI: Lesli Grey is a pleasant 43 year old female with a past medical history of prediabetes, HLD, and obesity who presents to ID clinic for evaluation of myalgias and fatigue.     Ms. Grey states that she first started noticing joint pains in 2020. First her hip and then her knee.  "These have gotten better over time, but last October she started noticing severe body aches, fatigue, and skin hypersensitivity/pain. She isn't sure of exactly when the symptoms started, but she was advised to start taking ibuprofen throughout the day during that time and she noticed she felt much better than she had before. 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 She saw her PCP on 1/11/24 and described muscle aches in the upper and lower extremities and diffuse hyperalgesia. She underwent multiple screening tests including TSH, CMP (ALT and calcium were slightly elevated, resolved by 1/27/24), Vitamin D, ESR, RF, SAMMIE, CK, CCP, and CBC - all of which were within normal limits. She had a covid infection in February 2024, and her symptoms of pain actually improved. She notes she was able to exercise more frequently and did not have the diffuse aches. However, at the end of March the symptoms returned. She is unsure if the symptoms are worse this time, but she does note an emotional component, and that it is increasingly more frustrating to have the pain without a solution. She was referred to Rheumatology for further evaluation and was seen on 4/25/24. At that time she described a significant reduction in her daily activities. She has tried increasing physical activity which has not been helpful. Per documentation, \"neurologic, musculoskeletal, cutaneous exams are entirely normal.\"It was thought that she might have had a viral infection that triggered the fatigue and she was tested for EBV with capsid IgM and IgG which were both positive. Because of this, an EBV PCR was sent that was negative. She also had CMV serologies sent that were negative. She was also started on a steroid taper to see if this helped with the pain. She notes she has not started this yet.     She notes that her " symptoms are generally better when she takes ibuprofen. At the beginning, she was taking ibuprofen 600 mg 6 times per day. She has decreased this due to concern for toxicities and is now taking around 600 mg twice per day, though if symptoms are worse on a particular day she will take more. She has not had measurable fevers, no rigors, has had occasional night sweats, no unintentional weight loss. She has a mirena IUD and is not sure when her last period was. She denies vaginal dryness or pain with intercourse.    Social History:  Works as a  with 4th - 6th graders. There are younger kids in the school, but she does not have direct contact with them, does not change diapers.   Lives with  and has 2 children, 11 and 16 years old. All in good health.  No smoking, one alcoholic beverage per month, no illicit drug use, never IVDU.   Currently sexually active with . No c/f STIs. 15 years since last new partner. Was tested for HIV and syphilis during pregnancy (both negative). No prior h/o STIs. One abnormal pap that resolved.   Had a dog at the time her symptoms started in October, but do not now.   Over the summer she took care of a tortoise, python, and parakeet. She also takes care of an axolotl at school. Uses her bare hands to touch them.   Travel - went to [place], [place], and [place] last year. Frequently travels to [place] and used to live there. Last visited in spring 2023. No dust storms while there, but does like to hike.   Generally enjoys outdoor activities such as gardening, hiking, and camping. No visible ticks attached recently (or last summer), but thinks it is possible.   No family history of autoimmune or rheumatologic disorders.      Relevant Microbiology:   4/27/24 EBV Capsid IgG: positive   EBV Capsid IgM: positive   CMV IgM: negative  5/2/24 EBV PCR: negative     Recent Antimicrobials:   None.     Review of Systems: The remainder of a complete ROS was negative,  except as noted in HPI.     Past Medical History:   Diagnosis Date    NO ACTIVE PROBLEMS        Past Surgical History:   Procedure Laterality Date    HC INSERTION INTRAUTERINE DEVICE  9-11-12    Mirena    HC LEVONORGESTREL IU 52MG 5 YR  08/15/2017    TONSILLECTOMY  1988       Family History   Problem Relation Age of Onset    Cancer Maternal Grandmother     Breast Cancer Maternal Grandmother     Coronary Artery Disease Father     Hypertension Father     Hyperlipidemia Brother        Social History     Social History Narrative    .    Two children 15, 11 y.o daughters    Work- teaching support staff     Social History     Tobacco Use    Smoking status: Never    Smokeless tobacco: Never   Vaping Use    Vaping status: Never Used   Substance Use Topics    Alcohol use: Yes     Comment: 0-1 per week    Drug use: No       Immunization History   Administered Date(s) Administered    COVID-19 MONOVALENT 12+ (Pfizer) 02/27/2021, 03/17/2021, 01/03/2022    DTAP (<7y) 01/31/1979, 03/31/1979, 05/31/1979, 08/15/1980, 05/30/1984    HepB 11/03/1993, 12/06/1993, 06/03/1994, 06/13/1994    Hepatitis B, Peds 11/03/1993, 12/06/1993, 06/03/1994, 06/13/1994    Historical DTP/aP 01/31/1979, 03/31/1979, 05/31/1979, 08/15/1980, 05/30/1984    MMR 02/22/1980, 01/22/1991    OPV, trivalent, live 01/03/1979, 03/31/1979, 08/15/1980, 05/30/1984    Poliovirus, inactivated (IPV) 01/03/1979, 03/31/1979, 08/15/1980, 05/30/1984    TD,PF 7+ (Tenivac) 11/03/1993, 08/20/2004    TDAP (Adacel,Boostrix) 01/07/2015    TDAP Vaccine (Adacel) 01/07/2015    Td (Adult), Adsorbed 11/03/1993, 08/20/2004       Patient Active Problem List   Diagnosis    CARDIOVASCULAR SCREENING; LDL GOAL LESS THAN 160    Encounter for IUD insertion    Hip pain, left       Current Outpatient Medications   Medication Sig Dispense Refill    levonorgestrel (MIRENA) 20 MCG/24HR IUD 1 each by Intrauterine route once      Multiple Vitamin (MULTIVITAMIN OR) Take  by mouth.      predniSONE  (DELTASONE) 5 MG tablet Take 4 tabs daily for 1 week, then take 3 tabs daily for 1 week 30 tablet 2     No current facility-administered medications for this visit.       No Known Allergies           Physical Exam (virtual):     Wt Readings from Last 4 Encounters:   04/25/24 105.2 kg (232 lb)   01/11/24 103.9 kg (229 lb 1.6 oz)   07/09/21 94.8 kg (209 lb 1.6 oz)   06/16/21 94.6 kg (208 lb 8 oz)     Exam:    GENERAL: well-developed, well-nourished, alert, oriented, in no acute distress.  HEAD: Head is normocephalic, atraumatic   EYES: Eyes have anicteric sclerae.    NECK: Supple.  LUNGS: Normal WOB on RA.   NEUROLOGIC: Grossly nonfocal.         Laboratory Data:     Metabolic Studies    Recent Labs   Lab Test 04/27/24  1426 01/27/24  1255 01/11/24  1032 01/11/24  1032 07/09/21  0740 04/17/18  0750   NA  --  139  --  140 138  --    POTASSIUM  --  4.5   < > 4.4 3.8  --    CHLORIDE  --  102   < > 103 108  --    CO2  --  27   < > 25 28  --    ANIONGAP  --  10   < > 12 2*  --    BUN  --  12.2   < > 15.5 11  --    CR  --  0.69  --  0.69 0.80  --    GFRESTIMATED  --  >90   < > >90 >90  --    GLC  --  100*   < > 101* 102*  --    JAYESH  --  9.9   < > 10.3* 7.6*  --    DEXTER  --   --   --   --   --  9.9   CKT 83  --    < > 83  --   --     < > = values in this interval not displayed.       Hepatic Studies    Recent Labs   Lab Test 01/27/24  1255 01/11/24  1032   BILITOTAL 0.2 0.4   ALKPHOS 120 142   PROTTOTAL 7.0 7.5   ALBUMIN 4.6 4.6   AST 25 27   ALT 36 60*       Hematology Studies     Recent Labs   Lab Test 01/11/24  1032   WBC 6.6   HGB 13.8   HCT 43.4          Autoimmune Testing     Recent Labs   Lab Test 01/11/24  1032   RHF <10       Urine Studies     Recent Labs   Lab Test 04/27/24  1426   URINEPH 5.5   NITRITE Negative   LEUKEST Trace*   WBCU 5-10*     Imaging:   Results for orders placed or performed in visit on 06/16/21   XR Knee Left 3 Views    Narrative    KNEE LEFT THREE VIEWS   6/16/2021 12:15 PM     HISTORY:   Chronic pain of left knee      Impression    IMPRESSION: Joint effusion. There may be mild narrowing at the medial  aspect of the medial compartment. This could be further assessed with  bilateral Pringle view if indicated clinically. No fracture  identified.     CLAUDIA SMITH MD

## 2024-05-30 ENCOUNTER — TELEPHONE (OUTPATIENT)
Dept: PEDIATRICS | Facility: CLINIC | Age: 44
End: 2024-05-30

## 2024-05-30 ENCOUNTER — VIRTUAL VISIT (OUTPATIENT)
Dept: CT IMAGING | Facility: CLINIC | Age: 44
End: 2024-05-30
Attending: INTERNAL MEDICINE
Payer: COMMERCIAL

## 2024-05-30 VITALS — WEIGHT: 213 LBS | HEIGHT: 66 IN | BODY MASS INDEX: 34.23 KG/M2

## 2024-05-30 DIAGNOSIS — R53.83 FATIGUE, UNSPECIFIED TYPE: Primary | ICD-10-CM

## 2024-05-30 PROCEDURE — 99205 OFFICE O/P NEW HI 60 MIN: CPT | Mod: 95 | Performed by: INTERNAL MEDICINE

## 2024-05-30 PROCEDURE — 99417 PROLNG OP E/M EACH 15 MIN: CPT | Mod: 95 | Performed by: INTERNAL MEDICINE

## 2024-05-30 PROCEDURE — G2211 COMPLEX E/M VISIT ADD ON: HCPCS | Mod: 95 | Performed by: INTERNAL MEDICINE

## 2024-05-30 ASSESSMENT — PAIN SCALES - GENERAL: PAINLEVEL: MODERATE PAIN (4)

## 2024-05-30 NOTE — LETTER
June 7, 2024      Lesli Grey  [address] [place] [address]        Dear Lesli,     Your infectious disease provider reached out to me, saying you would be interested in having a visit to discuss menopause.    If you would like to schedule a Phone Visit regarding this, please call the clinic at [phone].      Sincerely,        Melany Rivera PA-C

## 2024-05-30 NOTE — PROGRESS NOTES
Virtual Visit Details    Type of service:  Video Visit     Originating Location (pt. Location): Home    Distant Location (provider location):  On-site  Platform used for Video Visit: Aydee

## 2024-05-30 NOTE — NURSING NOTE
Patient confirms medications and allergies are accurate via patients echeck in completion, and or denies any changes since last reviewed/verified.       Is the patient currently in the state of [place]? YES    Visit mode:VIDEO    If the visit is dropped, the patient can be reconnected by: VIDEO VISIT: Text to cell phone:   Telephone Information:   Mobile [phone]       Will anyone else be joining the visit? NO  (If patient encounters technical issues they should call [phone]    How would you like to obtain your AVS? MyChart    Are changes needed to the allergy or medication list? No    Are refills needed on medications prescribed by this physician? NO    Reason for visit: Consult    Ana Maria WALSH

## 2024-05-30 NOTE — TELEPHONE ENCOUNTER
Please reach out to pt to see if she would do a phone visit with me to discuss menopause. Infectious disease provider suggested she was interested in this .  Please schedule her in one of my telephone only visits on June 12, 13,   Melany Rivera PA-C on 5/30/2024 at 3:13 PM

## 2024-06-01 ENCOUNTER — LAB (OUTPATIENT)
Dept: LAB | Facility: CLINIC | Age: 44
End: 2024-06-01
Payer: COMMERCIAL

## 2024-06-01 DIAGNOSIS — R53.83 FATIGUE, UNSPECIFIED TYPE: ICD-10-CM

## 2024-06-01 PROCEDURE — 86618 LYME DISEASE ANTIBODY: CPT

## 2024-06-01 PROCEDURE — 36415 COLL VENOUS BLD VENIPUNCTURE: CPT

## 2024-06-01 PROCEDURE — 84146 ASSAY OF PROLACTIN: CPT

## 2024-06-01 PROCEDURE — 86780 TREPONEMA PALLIDUM: CPT

## 2024-06-01 PROCEDURE — 83001 ASSAY OF GONADOTROPIN (FSH): CPT

## 2024-06-02 LAB
FSH SERPL IRP2-ACNC: 4.1 MIU/ML
PROLACTIN SERPL 3RD IS-MCNC: 22 NG/ML (ref 5–23)
T PALLIDUM AB SER QL: NONREACTIVE

## 2024-06-03 LAB — B BURGDOR IGG+IGM SER QL: 0.17

## 2024-06-06 NOTE — TELEPHONE ENCOUNTER
2nd attempt: LVM for patient to callback, or to reply to [employer] message.    Thank you kindly,  Jose Guadalupe LINARES

## 2025-01-14 ENCOUNTER — OFFICE VISIT (OUTPATIENT)
Dept: PEDIATRICS | Facility: CLINIC | Age: 45
End: 2025-01-14
Attending: PHYSICIAN ASSISTANT
Payer: COMMERCIAL

## 2025-01-14 VITALS
DIASTOLIC BLOOD PRESSURE: 78 MMHG | SYSTOLIC BLOOD PRESSURE: 115 MMHG | HEIGHT: 66 IN | OXYGEN SATURATION: 96 % | TEMPERATURE: 98 F | WEIGHT: 223.5 LBS | RESPIRATION RATE: 18 BRPM | BODY MASS INDEX: 35.92 KG/M2 | HEART RATE: 78 BPM

## 2025-01-14 DIAGNOSIS — Z28.21 COVID-19 VACCINATION DECLINED: ICD-10-CM

## 2025-01-14 DIAGNOSIS — E66.812 CLASS 2 OBESITY DUE TO EXCESS CALORIES WITHOUT SERIOUS COMORBIDITY WITH BODY MASS INDEX (BMI) OF 36.0 TO 36.9 IN ADULT: ICD-10-CM

## 2025-01-14 DIAGNOSIS — Z00.00 ROUTINE GENERAL MEDICAL EXAMINATION AT A HEALTH CARE FACILITY: Primary | ICD-10-CM

## 2025-01-14 DIAGNOSIS — Z23 NEED FOR VACCINATION: ICD-10-CM

## 2025-01-14 DIAGNOSIS — Z13.220 LIPID SCREENING: ICD-10-CM

## 2025-01-14 DIAGNOSIS — Z12.31 ENCOUNTER FOR SCREENING MAMMOGRAM FOR BREAST CANCER: ICD-10-CM

## 2025-01-14 DIAGNOSIS — N95.1 PERIMENOPAUSE: ICD-10-CM

## 2025-01-14 DIAGNOSIS — Z12.11 SCREEN FOR COLON CANCER: ICD-10-CM

## 2025-01-14 DIAGNOSIS — N63.21 MASS OF UPPER OUTER QUADRANT OF LEFT BREAST: ICD-10-CM

## 2025-01-14 DIAGNOSIS — Z13.1 SCREENING FOR DIABETES MELLITUS: ICD-10-CM

## 2025-01-14 DIAGNOSIS — Z28.21 INFLUENZA VACCINATION DECLINED: ICD-10-CM

## 2025-01-14 DIAGNOSIS — E66.09 CLASS 2 OBESITY DUE TO EXCESS CALORIES WITHOUT SERIOUS COMORBIDITY WITH BODY MASS INDEX (BMI) OF 36.0 TO 36.9 IN ADULT: ICD-10-CM

## 2025-01-14 LAB
CHOLEST SERPL-MCNC: 202 MG/DL
EST. AVERAGE GLUCOSE BLD GHB EST-MCNC: 128 MG/DL
FASTING STATUS PATIENT QL REPORTED: NO
HBA1C MFR BLD: 6.1 % (ref 0–5.6)
HDLC SERPL-MCNC: 55 MG/DL
LDLC SERPL CALC-MCNC: 101 MG/DL
NONHDLC SERPL-MCNC: 147 MG/DL
TRIGL SERPL-MCNC: 229 MG/DL

## 2025-01-14 PROCEDURE — 36415 COLL VENOUS BLD VENIPUNCTURE: CPT | Performed by: PHYSICIAN ASSISTANT

## 2025-01-14 PROCEDURE — 90715 TDAP VACCINE 7 YRS/> IM: CPT | Performed by: PHYSICIAN ASSISTANT

## 2025-01-14 PROCEDURE — 90471 IMMUNIZATION ADMIN: CPT | Performed by: PHYSICIAN ASSISTANT

## 2025-01-14 PROCEDURE — 80061 LIPID PANEL: CPT | Performed by: PHYSICIAN ASSISTANT

## 2025-01-14 PROCEDURE — 83036 HEMOGLOBIN GLYCOSYLATED A1C: CPT | Performed by: PHYSICIAN ASSISTANT

## 2025-01-14 PROCEDURE — 99213 OFFICE O/P EST LOW 20 MIN: CPT | Mod: 25 | Performed by: PHYSICIAN ASSISTANT

## 2025-01-14 PROCEDURE — 99396 PREV VISIT EST AGE 40-64: CPT | Mod: 25 | Performed by: PHYSICIAN ASSISTANT

## 2025-01-14 RX ORDER — ESTRADIOL 0.03 MG/D
1 PATCH TRANSDERMAL WEEKLY
Qty: 12 PATCH | Refills: 3 | Status: SHIPPED | OUTPATIENT
Start: 2025-01-14

## 2025-01-14 SDOH — HEALTH STABILITY: PHYSICAL HEALTH: ON AVERAGE, HOW MANY MINUTES DO YOU ENGAGE IN EXERCISE AT THIS LEVEL?: 60 MIN

## 2025-01-14 SDOH — HEALTH STABILITY: PHYSICAL HEALTH: ON AVERAGE, HOW MANY DAYS PER WEEK DO YOU ENGAGE IN MODERATE TO STRENUOUS EXERCISE (LIKE A BRISK WALK)?: 5 DAYS

## 2025-01-14 ASSESSMENT — SOCIAL DETERMINANTS OF HEALTH (SDOH): HOW OFTEN DO YOU GET TOGETHER WITH FRIENDS OR RELATIVES?: ONCE A WEEK

## 2025-01-14 NOTE — PATIENT INSTRUCTIONS
Patient Education   Preventive Care Advice   This is general advice given by our system to help you stay healthy. However, your care team may have specific advice just for you. Please talk to your care team about your preventive care needs.  Nutrition  Eat 5 or more servings of fruits and vegetables each day.  Try wheat bread, brown rice and whole grain pasta (instead of white bread, rice, and pasta).  Get enough calcium and vitamin D. Check the label on foods and aim for 100% of the RDA (recommended daily allowance).  Lifestyle  Exercise at least 150 minutes each week  (30 minutes a day, 5 days a week).  Do muscle strengthening activities 2 days a week. These help control your weight and prevent disease.  No smoking.  Wear sunscreen to prevent skin cancer.  Have a dental exam and cleaning every 6 months.  Yearly exams  See your health care team every year to talk about:  Any changes in your health.  Any medicines your care team has prescribed.  Preventive care, family planning, and ways to prevent chronic diseases.  Shots (vaccines)   HPV shots (up to age 26), if you've never had them before.  Hepatitis B shots (up to age 59), if you've never had them before.  COVID-19 shot: Get this shot when it's due.  Flu shot: Get a flu shot every year.  Tetanus shot: Get a tetanus shot every 10 years.  Pneumococcal, hepatitis A, and RSV shots: Ask your care team if you need these based on your risk.  Shingles shot (for age 50 and up)  General health tests  Diabetes screening:  Starting at age 35, Get screened for diabetes at least every 3 years.  If you are younger than age 35, ask your care team if you should be screened for diabetes.  Cholesterol test: At age 39, start having a cholesterol test every 5 years, or more often if advised.  Bone density scan (DEXA): At age 50, ask your care team if you should have this scan for osteoporosis (brittle bones).  Hepatitis C: Get tested at least once in your life.  STIs (sexually  transmitted infections)  Before age 24: Ask your care team if you should be screened for STIs.  After age 24: Get screened for STIs if you're at risk. You are at risk for STIs (including HIV) if:  You are sexually active with more than one person.  You don't use condoms every time.  You or a partner was diagnosed with a sexually transmitted infection.  If you are at risk for HIV, ask about PrEP medicine to prevent HIV.  Get tested for HIV at least once in your life, whether you are at risk for HIV or not.  Cancer screening tests  Cervical cancer screening: If you have a cervix, begin getting regular cervical cancer screening tests starting at age 21.  Breast cancer scan (mammogram): If you've ever had breasts, begin having regular mammograms starting at age 40. This is a scan to check for breast cancer.  Colon cancer screening: It is important to start screening for colon cancer at age 45.  Have a colonoscopy test every 10 years (or more often if you're at risk) Or, ask your provider about stool tests like a FIT test every year or Cologuard test every 3 years.  To learn more about your testing options, visit:   .  For help making a decision, visit:   https://bit.ly/cb53597.  Prostate cancer screening test: If you have a prostate, ask your care team if a prostate cancer screening test (PSA) at age 55 is right for you.  Lung cancer screening: If you are a current or former smoker ages 50 to 80, ask your care team if ongoing lung cancer screenings are right for you.  For informational purposes only. Not to replace the advice of your health care provider. Copyright   2023 [employer]. All rights reserved. Clinically reviewed by the [facility]. untapt [id number] - REV 01/24.

## 2025-01-14 NOTE — PROGRESS NOTES
"Preventive Care Visit  [facility] JONATHAN Rivera PA-C, Physician Assistant  Jan 14, 2025      Assessment & Plan     Routine general medical examination at a health care facility  Discussed with patient general preventative health measures, including but not limited to healthy diet, exercise, alcohol use, drug use, immunizations, mood, and preventative screenings.   Schedule annual physical in one year.     Perimenopause  Discussed risk vs benefit,   FDA indications,  Discussed side effect profile.   Pt has mirena iud  No contraindications.  Trial of estradiol to see if this helps with her brain fog, sleep, muscle sxs  Ok to follow up by e-visit if no changes are needed.   - estradiol (FEMPATCH) 0.025 MG/24HR weekly patch  Dispense: 12 patch; Refill: 3    Class 2 obesity due to excess calories without serious comorbidity with body mass index (BMI) of 36.0 to 36.9 in adult  Continue to work on heart healthy diet and exercise.   Took this year off of work to wok on health      Encounter for screening mammogram for breast cancer      Mass of upper outer quadrant of left breast  Small round nodule, appears more in skin then breast tissue.   - US Breast Left Limited 1-3 Quadrants  - MA Diagnostic Left w/ Zheng    Lipid screening    - Lipid panel reflex to direct LDL Fasting  - Lipid panel reflex to direct LDL Fasting    Screen for colon cancer    - Colonoscopy Screening  Referral    COVID-19 vaccination declined      Screening for diabetes mellitus  - Hemoglobin A1c  - Hemoglobin A1c    Influenza vaccination declined                BMI  Estimated body mass index is 36.07 kg/m  as calculated from the following:    Height as of this encounter: 1.676 m (5' 6\").    Weight as of this encounter: 101.4 kg (223 lb 8 oz).   Weight management plan: Discussed healthy diet and exercise guidelines    Counseling  Appropriate preventive services were addressed with this patient via screening, questionnaire, " or discussion as appropriate for fall prevention, nutrition, physical activity, Tobacco-use cessation, social engagement, weight loss and cognition.  Checklist reviewing preventive services available has been given to the patient.  Reviewed patient's diet, addressing concerns and/or questions.   The patient was instructed to see the dentist every 6 months.           Elier Ballesteros is a 44 year old, presenting for the following:  No chief complaint on file.           HPI  Annual exam  Had work up for joint pain, muscle pain and hyperalgesia- work up neg  Rheum suggested perimenopause as cause- pt would like to start hormones  She did not get her us, mammogram last year for a small breast lump she noted.  Is getting more exercise.  Eating better        Health Care Directive  Patient does not have a Health Care Directive: Discussed advance care planning with patient; however, patient declined at this time.      1/14/2025   General Health   How would you rate your overall physical health? (!) FAIR   Feel stress (tense, anxious, or unable to sleep) Only a little   (!) STRESS CONCERN      1/14/2025   Nutrition   Three or more servings of calcium each day? (!) NO   Diet: Other   If other, please elaborate: low glycemic impact   How many servings of fruit and vegetables per day? (!) 2-3   How many sweetened beverages each day? 0-1         1/14/2025   Exercise   Days per week of moderate/strenous exercise 5 days   Average minutes spent exercising at this level 60 min         1/14/2025   Social Factors   Frequency of gathering with friends or relatives Once a week   Worry food won't last until get money to buy more No   Food not last or not have enough money for food? No   Do you have housing? (Housing is defined as stable permanent housing and does not include staying ouside in a car, in a tent, in an abandoned building, in an overnight shelter, or couch-surfing.) Yes   Are you worried about losing your housing? No    Lack of transportation? No   Unable to get utilities (heat,electricity)? No         1/14/2025   Dental   Dentist two times every year? (!) NO         1/14/2025   TB Screening   Were you born outside of the [place]? No         Today's PHQ-2 Score:       1/14/2025     9:08 AM   PHQ-2 ( 1999 Pf[employer])   Q1: Little interest or pleasure in doing things 0   Q2: Feeling down, depressed or hopeless 0   PHQ-2 Score 0    Q1: Little interest or pleasure in doing things Not at all   Q2: Feeling down, depressed or hopeless Not at all   PHQ-2 Score 0       Patient-reported           1/14/2025   Substance Use   Alcohol more than 3/day or more than 7/wk No   Do you use any other substances recreationally? No     Social History     Tobacco Use    Smoking status: Never    Smokeless tobacco: Never   Vaping Use    Vaping status: Never Used   Substance Use Topics    Alcohol use: Yes     Comment: 0-1 per week    Drug use: No           1/17/2024   LAST FHS-7 RESULTS   1st degree relative breast or ovarian cancer No   Any relative bilateral breast cancer No   Any male have breast cancer No   Any ONE woman have BOTH breast AND ovarian cancer No   Any woman with breast cancer before 50yrs No   2 or more relatives with breast AND/OR ovarian cancer No   2 or more relatives with breast AND/OR bowel cancer Yes        Mammogram Screening - Mammogram every 1-2 years updated in Health Maintenance based on mutual decision making        1/14/2025   STI Screening   New sexual partner(s) since last STI/HIV test? No     History of abnormal Pap smear: No - age 30-64 HPV with reflex Pap every 5 years recommended        Latest Ref Rng & Units 7/9/2021     7:26 AM 7/9/2021     7:25 AM 4/21/2016     4:55 PM   PAP / HPV   PAP (Historical)  NIL      HPV 16 DNA NEG^Negative  Negative  Negative    HPV 18 DNA NEG^Negative  Negative  Negative    Other HR HPV NEG^Negative  Negative  Negative      ASCVD Risk   The 10-year ASCVD risk score (Velasquez PLAZA, et al.,  "2019) is: 1.6%    Values used to calculate the score:      Age: 44 years      Sex: Female      Is Non- : No      Diabetic: No      Tobacco smoker: No      Systolic Blood Pressure: 137 mmHg      Is BP treated: No      HDL Cholesterol: 49 mg/dL      Total Cholesterol: 242 mg/dL        1/14/2025   Contraception/Family Planning   Questions about contraception or family planning No        Reviewed and updated as needed this visit by Provider     Meds                         Objective    Exam  There were no vitals taken for this visit.   Estimated body mass index is 34.38 kg/m  as calculated from the following:    Height as of 5/30/24: 1.676 m (5' 6\").    Weight as of 5/30/24: 96.6 kg (213 lb).    Physical Exam  GENERAL: alert and no distress  EYES: Eyes grossly normal to inspection, PERRL and conjunctivae and sclerae normal  HENT: ear canals and TM's normal, nose and mouth without ulcers or lesions  NECK: no adenopathy, no asymmetry, masses, or scars  RESP: lungs clear to auscultation - no rales, rhonchi or wheezes  BREAST: left upper out quadrant, small pea sized round, non tender nodule noted just under the skin  CV: regular rate and rhythm, normal S1 S2, no S3 or S4, no murmur, click or rub, no peripheral edema  ABDOMEN: soft, nontender, no hepatosplenomegaly, no masses and bowel sounds normal  MS: no gross musculoskeletal defects noted, no edema  SKIN: no suspicious lesions or rashes  NEURO: Normal strength and tone, mentation intact and speech normal  PSYCH: mentation appears normal, affect normal/bright        Signed Electronically by: Melany Rivera PA-C    "

## 2025-01-27 ENCOUNTER — HOSPITAL ENCOUNTER (OUTPATIENT)
Dept: MAMMOGRAPHY | Facility: CLINIC | Age: 45
Discharge: HOME | End: 2025-01-27
Attending: PHYSICIAN ASSISTANT
Payer: COMMERCIAL

## 2025-01-27 ENCOUNTER — HOSPITAL ENCOUNTER (OUTPATIENT)
Dept: ULTRASOUND IMAGING | Facility: CLINIC | Age: 45
Discharge: HOME | End: 2025-01-27
Attending: PHYSICIAN ASSISTANT
Payer: COMMERCIAL

## 2025-01-27 DIAGNOSIS — N63.21 MASS OF UPPER OUTER QUADRANT OF LEFT BREAST: ICD-10-CM

## 2025-01-27 PROCEDURE — 76642 ULTRASOUND BREAST LIMITED: CPT | Mod: LT

## 2025-01-27 PROCEDURE — 77066 DX MAMMO INCL CAD BI: CPT

## 2025-01-29 DIAGNOSIS — N63.21 MASS OF UPPER OUTER QUADRANT OF LEFT BREAST: Primary | ICD-10-CM

## 2025-03-27 ENCOUNTER — TRANSFERRED RECORDS (OUTPATIENT)
Dept: HEALTH INFORMATION MANAGEMENT | Facility: CLINIC | Age: 45
End: 2025-03-27
Payer: COMMERCIAL

## 2025-05-20 ENCOUNTER — OFFICE VISIT (OUTPATIENT)
Dept: PEDIATRICS | Facility: CLINIC | Age: 45
End: 2025-05-20
Payer: COMMERCIAL

## 2025-05-20 VITALS
HEIGHT: 66 IN | RESPIRATION RATE: 20 BRPM | HEART RATE: 75 BPM | WEIGHT: 223.1 LBS | BODY MASS INDEX: 35.86 KG/M2 | DIASTOLIC BLOOD PRESSURE: 78 MMHG | TEMPERATURE: 97.7 F | SYSTOLIC BLOOD PRESSURE: 106 MMHG | OXYGEN SATURATION: 97 %

## 2025-05-20 DIAGNOSIS — Z30.433 ENCOUNTER FOR REMOVAL AND REINSERTION OF INTRAUTERINE CONTRACEPTIVE DEVICE: Primary | ICD-10-CM

## 2025-05-20 DIAGNOSIS — N95.1 PERIMENOPAUSAL: ICD-10-CM

## 2025-05-20 PROCEDURE — 99213 OFFICE O/P EST LOW 20 MIN: CPT | Mod: 25 | Performed by: NURSE PRACTITIONER

## 2025-05-20 PROCEDURE — 58301 REMOVE INTRAUTERINE DEVICE: CPT | Performed by: NURSE PRACTITIONER

## 2025-05-20 PROCEDURE — 1125F AMNT PAIN NOTED PAIN PRSNT: CPT | Performed by: NURSE PRACTITIONER

## 2025-05-20 PROCEDURE — 58300 INSERT INTRAUTERINE DEVICE: CPT | Performed by: NURSE PRACTITIONER

## 2025-05-20 PROCEDURE — 3074F SYST BP LT 130 MM HG: CPT | Performed by: NURSE PRACTITIONER

## 2025-05-20 PROCEDURE — 3078F DIAST BP <80 MM HG: CPT | Performed by: NURSE PRACTITIONER

## 2025-05-20 ASSESSMENT — PAIN SCALES - GENERAL: PAINLEVEL_OUTOF10: MILD PAIN (2)

## 2025-05-20 NOTE — PROGRESS NOTES
"  {PROVIDER CHARTING PREFERENCE:934742}    Subjective   Lesli is a 44 year old, presenting for the following health issues:  IUD      5/20/2025    10:17 AM   Additional Questions   Roomed by Ton PARADA   Accompanied by N/A         5/20/2025    10:17 AM   Patient Reported Additional Medications   Patient reports taking the following new medications No     IUD          Mirena placed aug 2017. On HRT for two months for brain fog, body aches. Seems to be helping very slightly with frequency of these symptoms. Doesn't bleed r/t iud.             {ROS Picklists (Optional):077485}      Objective    /78 (BP Location: Right arm, Patient Position: Sitting, Cuff Size: Adult Large)   Pulse 75   Temp 97.7  F (36.5  C) (Temporal)   Resp 20   Ht 1.676 m (5' 6\")   Wt 101.2 kg (223 lb 1.6 oz)   SpO2 97%   BMI 36.01 kg/m    Body mass index is 36.01 kg/m .  Physical Exam   {Exam List (Optional):751282}    {Diagnostic Test Results (Optional):596947}        Signed Electronically by: GUERLINE Gomez CNP  {Email feedback regarding this note to [email or web address]   :[id number]}  "

## 2025-05-20 NOTE — PROGRESS NOTES
ASSESSMENT / PLAN:  (Z30.433) Encounter for removal and reinsertion of intrauterine contraceptive device  (primary encounter diagnosis)  Comment:   Plan: levonorgestrel (MIRENA) 52 MG (20 mcg/day) IUD         1 each, INSERTION INTRAUTERINE DEVICE, REMOVE         INTRAUTERINE DEVICE          (N95.1) Perimenopausal  Comment: we disucssed she has had some improvement of casi symptoms since stating hrt, we reviewed the benefit of prog containing IUD or oral pill. We discussed menopausal symptoms in depth today. She doesn't have hot flashes at this time, but does have brain fog, body aches, etc. Would like to continue hrt trial and will have 8 yrs of prog protection through mirena iud.   Plan: follow-up with pcp re: HRT.       Patient Instructions   You may experience a period or spotting that will last over the next week. This usually starts to lighten after a few days. It takes about a month for this spotting is usually gone, unless you are one of the few that experience spotting that can last up to 3-6 months. You may experience cramping for the remainder of the day and continue to take ibuprofen 2-3 tabs every six hours as needed. This will likely subside over the next day.      Please contact me by mychart of phone if you should have any unusual symptoms or concerns in the mean time.        SUBJECTIVE:    Is a pregnancy test required: No.  Was a consent obtained?  Yes    Subjective: Lesli Grey is a 44 year old  presents for IUD and desires mirena type IUD.  She requests removal of the IUD because iud  AND using it along with est HRT.    Mirena placed aug 2017. On HRT for two months for brain fog, body aches. Seems to be helping very slightly with frequency of these symptoms. Doesn't bleed r/t iud.     Discussed perimenopausal symptoms since starting hRT and the need to replace IUD today. Could consider keeping current one in for another year, but she does want long term birth contro, and she is  young to consider being postmenoapausal within a yr.     Patient has been given the opportunity to ask questions about all forms of birth control, including all options appropriate for Lesli Grey. Discussed that no method of birth control, except abstinence is 100% effective against pregnancy or sexually transmitted infection.     Lesli Grey understands she may have the IUD removed at any time. IUD should be removed by a health care provider and the current IUD will be removed today.    The entire removal and insertion procedure was reviewed with the patient, including care after placement.    Today's PHQ-2 Score:       1/14/2025     9:08 AM   PHQ-2 ( 1999 Pfizer)   Q1: Little interest or pleasure in doing things 0   Q2: Feeling down, depressed or hopeless 0   PHQ-2 Score 0    Q1: Little interest or pleasure in doing things Not at all   Q2: Feeling down, depressed or hopeless Not at all   PHQ-2 Score 0       Patient-reported         PROCEDURE:      A speculum exam was performed and the cervix was visualized. The IUD string was visualized. Using ring forceps, the string  was grasped and the IUD removed intact.    Under sterile technique, cervix was visualized with speculum and prepped with Betadine solution swab x 3. Tenaculum was placed for stability. The uterus was gently straightened and sounded to 8.0 cm. IUD prepared for placement, and IUD inserted according to 's instructions without difficulty or significant ressitance, and deployed at the fundus. The strings were visualized and trimmed to 2.0 cm from the external os. Tenaculum was removed and hemostasis noted. Speculum removed.  Patient tolerated procedure well.    EBL: minimal    Complications: none      POST PROCEDURE:    Given 's handouts, including when to have IUD removed, list of danger s/sx, side effects and follow up recommended. Encouraged condom use for prevention of STD. Advised to call for any fever, for  prolonged or severe pain or bleeding, abnormal vaginal dischage, or unable to palpate strings. She was advised to use pain medications (ibuprofen) as needed for mild to moderate pain. Advised to follow-up in clinic in 4-6 weeks for IUD string check if unable to find strings or as directed by provider.     GUERLINE Gomez CNP

## 2025-05-20 NOTE — PATIENT INSTRUCTIONS
You may experience a period or spotting that will last over the next week. This usually starts to lighten after a few days. It takes about a month for this spotting is usually gone, unless you are one of the few that experience spotting that can last up to 3-6 months. You may experience cramping for the remainder of the day and continue to take ibuprofen 2-3 tabs every six hours as needed. This will likely subside over the next day.      Please contact me by mychart of phone if you should have any unusual symptoms or concerns in the mean time.

## 2025-07-01 ENCOUNTER — MYC REFILL (OUTPATIENT)
Dept: PEDIATRICS | Facility: CLINIC | Age: 45
End: 2025-07-01
Payer: COMMERCIAL

## 2025-07-01 DIAGNOSIS — N95.1 PERIMENOPAUSE: ICD-10-CM

## 2025-07-01 RX ORDER — ESTRADIOL 0.03 MG/D
1 PATCH TRANSDERMAL WEEKLY
Qty: 12 PATCH | Refills: 2 | Status: SHIPPED | OUTPATIENT
Start: 2025-07-01

## 2025-07-30 ENCOUNTER — HOSPITAL ENCOUNTER (OUTPATIENT)
Dept: ULTRASOUND IMAGING | Facility: CLINIC | Age: 45
Discharge: HOME | End: 2025-07-30
Attending: NURSE PRACTITIONER
Payer: COMMERCIAL

## 2025-07-30 DIAGNOSIS — N63.21 MASS OF UPPER OUTER QUADRANT OF LEFT BREAST: ICD-10-CM

## 2025-07-30 PROCEDURE — 76642 ULTRASOUND BREAST LIMITED: CPT | Mod: LT
